# Patient Record
Sex: MALE | Race: BLACK OR AFRICAN AMERICAN | NOT HISPANIC OR LATINO | Employment: OTHER | URBAN - METROPOLITAN AREA
[De-identification: names, ages, dates, MRNs, and addresses within clinical notes are randomized per-mention and may not be internally consistent; named-entity substitution may affect disease eponyms.]

---

## 2017-07-06 ENCOUNTER — HISTORICAL (OUTPATIENT)
Dept: RADIOLOGY | Facility: HOSPITAL | Age: 57
End: 2017-07-06

## 2017-07-06 LAB
ABS NEUT (OLG): 2.13 X10(3)/MCL (ref 2.1–9.2)
ALBUMIN SERPL-MCNC: 4.3 GM/DL (ref 3.4–5)
ALBUMIN/GLOB SERPL: 1.3 RATIO (ref 1.1–2)
ALP SERPL-CCNC: 93 UNIT/L (ref 50–136)
ALT SERPL-CCNC: 26 UNIT/L (ref 12–78)
APPEARANCE, UA: CLEAR
AST SERPL-CCNC: 15 UNIT/L (ref 15–37)
BACTERIA #/AREA URNS AUTO: ABNORMAL /HPF
BASOPHILS # BLD AUTO: 0 X10(3)/MCL (ref 0–0.2)
BASOPHILS NFR BLD AUTO: 0 %
BILIRUB SERPL-MCNC: 0.8 MG/DL (ref 0.2–1)
BILIRUB UR QL STRIP: ABNORMAL
BILIRUBIN DIRECT+TOT PNL SERPL-MCNC: 0.1 MG/DL (ref 0–0.5)
BILIRUBIN DIRECT+TOT PNL SERPL-MCNC: 0.7 MG/DL (ref 0–0.8)
BUN SERPL-MCNC: 20 MG/DL (ref 7–18)
CALCIUM SERPL-MCNC: 9.4 MG/DL (ref 8.5–10.1)
CHLORIDE SERPL-SCNC: 109 MMOL/L (ref 98–107)
CHOLEST SERPL-MCNC: 177 MG/DL (ref 0–200)
CHOLEST/HDLC SERPL: 4.9 {RATIO} (ref 0–5)
CO2 SERPL-SCNC: 26 MMOL/L (ref 21–32)
COLOR UR: ABNORMAL
CREAT SERPL-MCNC: 1.02 MG/DL (ref 0.7–1.3)
EOSINOPHIL # BLD AUTO: 0.1 X10(3)/MCL (ref 0–0.9)
EOSINOPHIL NFR BLD AUTO: 2 %
ERYTHROCYTE [DISTWIDTH] IN BLOOD BY AUTOMATED COUNT: 12.7 % (ref 11.5–17)
GLOBULIN SER-MCNC: 3.4 GM/DL (ref 2.4–3.5)
GLUCOSE (UA): NEGATIVE
GLUCOSE SERPL-MCNC: 90 MG/DL (ref 74–106)
HCT VFR BLD AUTO: 42.7 % (ref 42–52)
HDLC SERPL-MCNC: 36 MG/DL (ref 35–60)
HGB BLD-MCNC: 14.1 GM/DL (ref 14–18)
HGB UR QL STRIP: NEGATIVE
KETONES UR QL STRIP: NEGATIVE
LDLC SERPL CALC-MCNC: 119 MG/DL (ref 0–129)
LEUKOCYTE ESTERASE UR QL STRIP: NEGATIVE
LYMPHOCYTES # BLD AUTO: 1.9 X10(3)/MCL (ref 0.6–4.6)
LYMPHOCYTES NFR BLD AUTO: 41 %
MCH RBC QN AUTO: 32.9 PG (ref 27–31)
MCHC RBC AUTO-ENTMCNC: 33 GM/DL (ref 33–36)
MCV RBC AUTO: 99.5 FL (ref 80–94)
MONOCYTES # BLD AUTO: 0.5 X10(3)/MCL (ref 0.1–1.3)
MONOCYTES NFR BLD AUTO: 11 %
NEUTROPHILS # BLD AUTO: 2.13 X10(3)/MCL (ref 1.4–7.9)
NEUTROPHILS NFR BLD AUTO: 46 %
NITRITE UR QL STRIP.AUTO: NEGATIVE
PH UR STRIP: 5.5 [PH] (ref 5–9)
PLATELET # BLD AUTO: 237 X10(3)/MCL (ref 130–400)
PMV BLD AUTO: 8.5 FL (ref 9.4–12.4)
POTASSIUM SERPL-SCNC: 5.1 MMOL/L (ref 3.5–5.1)
PROT SERPL-MCNC: 7.7 GM/DL (ref 6.4–8.2)
PROT UR QL STRIP: NEGATIVE
PSA SERPL-MCNC: 6.27 NG/ML (ref 0–4)
RBC # BLD AUTO: 4.29 X10(6)/MCL (ref 4.7–6.1)
RBC #/AREA URNS HPF: ABNORMAL /[HPF]
SODIUM SERPL-SCNC: 143 MMOL/L (ref 136–145)
SP GR UR STRIP: 1.03 (ref 1–1.03)
SQUAMOUS EPITHELIAL, UA: ABNORMAL
TRIGL SERPL-MCNC: 111 MG/DL (ref 30–150)
UROBILINOGEN UR STRIP-ACNC: 1
VIT B12 SERPL-MCNC: 669 PG/ML (ref 193–986)
VLDLC SERPL CALC-MCNC: 22 MG/DL
WBC # SPEC AUTO: 4.6 X10(3)/MCL (ref 4.5–11.5)
WBC #/AREA URNS AUTO: ABNORMAL /HPF (ref 0–3)

## 2019-12-19 ENCOUNTER — HISTORICAL (OUTPATIENT)
Dept: ADMINISTRATIVE | Facility: HOSPITAL | Age: 59
End: 2019-12-19

## 2019-12-19 LAB
ABS NEUT (OLG): 2.89 X10(3)/MCL (ref 2.1–9.2)
ALBUMIN SERPL-MCNC: 4.2 GM/DL (ref 3.4–5)
ALBUMIN/GLOB SERPL: 1.1 {RATIO}
ALP SERPL-CCNC: 88 UNIT/L (ref 50–136)
ALT SERPL-CCNC: 37 UNIT/L (ref 12–78)
APPEARANCE, UA: CLEAR
AST SERPL-CCNC: 19 UNIT/L (ref 15–37)
BACTERIA SPEC CULT: NORMAL /HPF
BASOPHILS # BLD AUTO: 0 X10(3)/MCL (ref 0–0.2)
BASOPHILS NFR BLD AUTO: 1 %
BILIRUB SERPL-MCNC: 0.9 MG/DL (ref 0.2–1)
BILIRUB UR QL STRIP: NEGATIVE
BILIRUBIN DIRECT+TOT PNL SERPL-MCNC: 0.2 MG/DL (ref 0–0.2)
BILIRUBIN DIRECT+TOT PNL SERPL-MCNC: 0.7 MG/DL (ref 0–0.8)
BUN SERPL-MCNC: 15 MG/DL (ref 7–18)
CALCIUM SERPL-MCNC: 9.6 MG/DL (ref 8.5–10.1)
CHLORIDE SERPL-SCNC: 105 MMOL/L (ref 98–107)
CO2 SERPL-SCNC: 26 MMOL/L (ref 21–32)
COLOR UR: YELLOW
CREAT SERPL-MCNC: 0.96 MG/DL (ref 0.7–1.3)
EOSINOPHIL # BLD AUTO: 0 X10(3)/MCL (ref 0–0.9)
EOSINOPHIL NFR BLD AUTO: 1 %
ERYTHROCYTE [DISTWIDTH] IN BLOOD BY AUTOMATED COUNT: 12.6 % (ref 11.5–17)
GLOBULIN SER-MCNC: 3.9 GM/DL (ref 2.4–3.5)
GLUCOSE (UA): NEGATIVE
GLUCOSE SERPL-MCNC: 94 MG/DL (ref 74–106)
HCT VFR BLD AUTO: 44 % (ref 42–52)
HGB BLD-MCNC: 14.3 GM/DL (ref 14–18)
HGB UR QL STRIP: NEGATIVE
KETONES UR QL STRIP: NEGATIVE
LEUKOCYTE ESTERASE UR QL STRIP: NEGATIVE
LYMPHOCYTES # BLD AUTO: 2 X10(3)/MCL (ref 0.6–4.6)
LYMPHOCYTES NFR BLD AUTO: 37 %
MCH RBC QN AUTO: 32.1 PG (ref 27–31)
MCHC RBC AUTO-ENTMCNC: 32.5 GM/DL (ref 33–36)
MCV RBC AUTO: 98.9 FL (ref 80–94)
MONOCYTES # BLD AUTO: 0.4 X10(3)/MCL (ref 0.1–1.3)
MONOCYTES NFR BLD AUTO: 8 %
NEUTROPHILS # BLD AUTO: 2.89 X10(3)/MCL (ref 2.1–9.2)
NEUTROPHILS NFR BLD AUTO: 54 %
NITRITE UR QL STRIP: NEGATIVE
PH UR STRIP: 5.5 [PH] (ref 5–9)
PLATELET # BLD AUTO: 252 X10(3)/MCL (ref 130–400)
PMV BLD AUTO: 9 FL (ref 9.4–12.4)
POTASSIUM SERPL-SCNC: 4.3 MMOL/L (ref 3.5–5.1)
PROT SERPL-MCNC: 8.1 GM/DL (ref 6.4–8.2)
PROT UR QL STRIP: NEGATIVE
PSA SERPL-MCNC: 12.9 NG/ML (ref 0–4)
RBC # BLD AUTO: 4.45 X10(6)/MCL (ref 4.7–6.1)
RBC #/AREA URNS HPF: NORMAL /[HPF]
SODIUM SERPL-SCNC: 137 MMOL/L (ref 136–145)
SP GR UR STRIP: 1.03 (ref 1–1.03)
SQUAMOUS EPITHELIAL, UA: NORMAL
TSH SERPL-ACNC: 0.86 MIU/L (ref 0.36–3.74)
URATE SERPL-MCNC: 5.1 MG/DL (ref 2.6–7.2)
UROBILINOGEN UR STRIP-ACNC: 0.2
WBC # SPEC AUTO: 5.4 X10(3)/MCL (ref 4.5–11.5)
WBC #/AREA URNS HPF: NORMAL /HPF

## 2019-12-26 ENCOUNTER — HISTORICAL (OUTPATIENT)
Dept: RADIOLOGY | Facility: HOSPITAL | Age: 59
End: 2019-12-26

## 2020-01-13 LAB — CRC RECOMMENDATION EXT: NORMAL

## 2020-12-07 ENCOUNTER — HISTORICAL (OUTPATIENT)
Dept: CARDIOLOGY | Facility: HOSPITAL | Age: 60
End: 2020-12-07

## 2021-01-05 ENCOUNTER — HISTORICAL (OUTPATIENT)
Dept: ADMINISTRATIVE | Facility: HOSPITAL | Age: 61
End: 2021-01-05

## 2021-01-05 LAB
BUN SERPL-MCNC: 13.4 MG/DL (ref 8.4–25.7)
CALCIUM SERPL-MCNC: 8.6 MG/DL (ref 8.4–10.2)
CHLORIDE SERPL-SCNC: 103 MMOL/L (ref 98–107)
CO2 SERPL-SCNC: 25 MMOL/L (ref 22–29)
CREAT SERPL-MCNC: 0.87 MG/DL (ref 0.72–1.25)
CREAT/UREA NIT SERPL: 15
GLUCOSE SERPL-MCNC: 86 MG/DL (ref 74–100)
POC CREATININE: 1.1 MG/DL (ref 0.6–1.3)
POTASSIUM SERPL-SCNC: 4.5 MMOL/L (ref 3.5–5.1)
SODIUM SERPL-SCNC: 134 MMOL/L (ref 136–145)

## 2021-03-31 ENCOUNTER — HISTORICAL (OUTPATIENT)
Dept: ADMINISTRATIVE | Facility: HOSPITAL | Age: 61
End: 2021-03-31

## 2021-03-31 LAB
ABS NEUT (OLG): 1.98 X10(3)/MCL (ref 2.1–9.2)
ALBUMIN SERPL-MCNC: 4.4 GM/DL (ref 3.5–5)
ALBUMIN/GLOB SERPL: 1.5 RATIO (ref 1.1–2)
ALP SERPL-CCNC: 78 UNIT/L (ref 40–150)
ALT SERPL-CCNC: 12 UNIT/L (ref 0–55)
APPEARANCE, UA: CLEAR
AST SERPL-CCNC: 15 UNIT/L (ref 5–34)
BACTERIA SPEC CULT: ABNORMAL /HPF
BASOPHILS # BLD AUTO: 0 X10(3)/MCL (ref 0–0.2)
BASOPHILS NFR BLD AUTO: 1 %
BILIRUB SERPL-MCNC: 0.9 MG/DL
BILIRUB UR QL STRIP: NEGATIVE
BILIRUBIN DIRECT+TOT PNL SERPL-MCNC: 0.3 MG/DL (ref 0–0.5)
BILIRUBIN DIRECT+TOT PNL SERPL-MCNC: 0.6 MG/DL (ref 0–0.8)
BUN SERPL-MCNC: 13.2 MG/DL (ref 8.4–25.7)
CALCIUM SERPL-MCNC: 9.3 MG/DL (ref 8.4–10.2)
CHLORIDE SERPL-SCNC: 107 MMOL/L (ref 98–107)
CHOLEST SERPL-MCNC: 171 MG/DL
CHOLEST/HDLC SERPL: 4 {RATIO} (ref 0–5)
CO2 SERPL-SCNC: 28 MMOL/L (ref 22–29)
COLOR UR: YELLOW
CREAT SERPL-MCNC: 0.91 MG/DL (ref 0.73–1.18)
DEPRECATED CALCIDIOL+CALCIFEROL SERPL-MC: 10.5 NG/ML (ref 30–80)
EOSINOPHIL # BLD AUTO: 0.1 X10(3)/MCL (ref 0–0.9)
EOSINOPHIL NFR BLD AUTO: 2 %
ERYTHROCYTE [DISTWIDTH] IN BLOOD BY AUTOMATED COUNT: 13.1 % (ref 11.5–17)
EST. AVERAGE GLUCOSE BLD GHB EST-MCNC: 102.5 MG/DL
GLOBULIN SER-MCNC: 3 GM/DL (ref 2.4–3.5)
GLUCOSE (UA): NEGATIVE
GLUCOSE SERPL-MCNC: 80 MG/DL (ref 74–100)
HAV IGM SERPL QL IA: NONREACTIVE
HBA1C MFR BLD: 5.2 %
HBV CORE IGM SERPL QL IA: NONREACTIVE
HBV SURFACE AG SERPL QL IA: NONREACTIVE
HCT VFR BLD AUTO: 41.1 % (ref 42–52)
HCV AB SERPL QL IA: NONREACTIVE
HDLC SERPL-MCNC: 43 MG/DL (ref 35–60)
HEPATITIS PANEL INTERP: NORMAL
HGB BLD-MCNC: 13.1 GM/DL (ref 14–18)
HGB UR QL STRIP: NEGATIVE
HIV 1+2 AB+HIV1 P24 AG SERPL QL IA: NONREACTIVE
KETONES UR QL STRIP: ABNORMAL
LDLC SERPL CALC-MCNC: 113 MG/DL (ref 50–140)
LEUKOCYTE ESTERASE UR QL STRIP: NEGATIVE
LYMPHOCYTES # BLD AUTO: 2.1 X10(3)/MCL (ref 0.6–4.6)
LYMPHOCYTES NFR BLD AUTO: 45 %
MCH RBC QN AUTO: 32.8 PG (ref 27–31)
MCHC RBC AUTO-ENTMCNC: 31.9 GM/DL (ref 33–36)
MCV RBC AUTO: 103 FL (ref 80–94)
MONOCYTES # BLD AUTO: 0.4 X10(3)/MCL (ref 0.1–1.3)
MONOCYTES NFR BLD AUTO: 9 %
NEUTROPHILS # BLD AUTO: 1.98 X10(3)/MCL (ref 2.1–9.2)
NEUTROPHILS NFR BLD AUTO: 42 %
NITRITE UR QL STRIP: NEGATIVE
PH UR STRIP: 5.5 [PH] (ref 5–9)
PLATELET # BLD AUTO: 221 X10(3)/MCL (ref 130–400)
PMV BLD AUTO: 9.2 FL (ref 9.4–12.4)
POTASSIUM SERPL-SCNC: 4.9 MMOL/L (ref 3.5–5.1)
PROT SERPL-MCNC: 7.4 GM/DL (ref 6.4–8.3)
PROT UR QL STRIP: NEGATIVE
PSA SERPL-MCNC: 20.52 NG/ML
RBC # BLD AUTO: 3.99 X10(6)/MCL (ref 4.7–6.1)
RBC #/AREA URNS HPF: ABNORMAL /[HPF]
SODIUM SERPL-SCNC: 144 MMOL/L (ref 136–145)
SP GR UR STRIP: 1.02 (ref 1–1.03)
SQUAMOUS EPITHELIAL, UA: ABNORMAL /HPF (ref 0–4)
T PALLIDUM AB SER QL: NONREACTIVE
TRIGL SERPL-MCNC: 74 MG/DL (ref 34–140)
TSH SERPL-ACNC: 0.78 UIU/ML (ref 0.35–4.94)
UROBILINOGEN UR STRIP-ACNC: 1
VLDLC SERPL CALC-MCNC: 15 MG/DL
WBC # SPEC AUTO: 4.7 X10(3)/MCL (ref 4.5–11.5)
WBC #/AREA URNS HPF: ABNORMAL /HPF

## 2022-04-07 ENCOUNTER — HISTORICAL (OUTPATIENT)
Dept: ADMINISTRATIVE | Facility: HOSPITAL | Age: 62
End: 2022-04-07

## 2022-04-24 VITALS
HEIGHT: 69 IN | DIASTOLIC BLOOD PRESSURE: 85 MMHG | SYSTOLIC BLOOD PRESSURE: 125 MMHG | OXYGEN SATURATION: 96 % | BODY MASS INDEX: 21.78 KG/M2 | WEIGHT: 147.06 LBS

## 2022-08-24 DIAGNOSIS — C61 MALIGNANT NEOPLASM OF PROSTATE: Primary | ICD-10-CM

## 2022-10-12 DIAGNOSIS — C61 MALIGNANT NEOPLASM OF PROSTATE: Primary | ICD-10-CM

## 2022-11-03 ENCOUNTER — HOSPITAL ENCOUNTER (OUTPATIENT)
Dept: RADIOLOGY | Facility: HOSPITAL | Age: 62
Discharge: HOME | End: 2022-11-03
Attending: UROLOGY
Payer: MEDICARE

## 2022-11-03 DIAGNOSIS — C61 MALIGNANT NEOPLASM OF PROSTATE: ICD-10-CM

## 2022-11-03 PROCEDURE — 78815 PET IMAGE W/CT SKULL-THIGH: CPT | Mod: TC,PS

## 2022-12-19 ENCOUNTER — DOCUMENTATION ONLY (OUTPATIENT)
Dept: FAMILY MEDICINE | Facility: CLINIC | Age: 62
End: 2022-12-19
Payer: MEDICAID

## 2024-03-13 DIAGNOSIS — I73.9 PAD (PERIPHERAL ARTERY DISEASE): Primary | ICD-10-CM

## 2024-03-13 RX ORDER — TRAMADOL HYDROCHLORIDE 50 MG/1
50 TABLET ORAL
Status: ON HOLD | COMMUNITY
Start: 2023-09-27 | End: 2024-04-19 | Stop reason: HOSPADM

## 2024-03-13 NOTE — PROGRESS NOTES
"    [facility] - Clinic Note  Joe Lynne MD      Patient Name: Peng Reed                   : 1960      MRN: [id number]   Visit Date: 3/18/2024       History Present Illness     Reason for Visit: Leg Pain    Mr. Reed presents to the clinic for evaluation of foot pain. He is being referred by his podiatrsist, Dr. Zelaya. He has a history of revascularization with Dr. Motley in , however he failed to maintain follow up. He is s/p left dtjupxk-xa-rulxtezeg bypass 21 and right etxircv-kv-reukkgfte bypass 3/4/21. He said he has has had left foot pain since the surgery.  He says he can walk about 50 ft before he has to stop due to claudication symptoms.  He denies any wounds to his lower extremities.  He does not take any antiplatelet medications.  His only medication currently is tramadol.  He does smoke about a pack per week.      REVIEW OF SYSTEMS:  12 point review of systems conducted, negative except as stated in the history of present illness. See HPI for details.        Physical Exam      Vitals:    24 0858 24 0859   BP: 137/89 (!) 137/93   BP Location: Left arm Right arm   Pulse: 78 73   Weight: 68 kg (150 lb)    Height: 5' 9" (1.753 m)           General: well-nourished, no acute distress, and healthy appearing, alert, pleasant, conversant, and oriented  Neurologic: cranial nerves are grossly intact, no neurologic deficits, no motor deficits, and no sensory deficits  Neck/Chest: normal , soft without lymphadenopathy, and no carotid bruits noted  Respiratory: breathing easily, without respiratory distress, and normal breath sounds  Abdomen: normal and soft  Cardiology: regular rate and rhythm and no audible murmur    Upper Extremity Arterial Exam:   Right - radial is palpable and brachial is palpable  Left - radial is palpable and brachial is palpable    Lower Extremity Arterial Exam:  Right - femoral is palpable, posterior tibial is absent, and dorsalis pedis is " biphasic with doppler  Left - femoral is palpable, posterior tibial is absent, and dorsalis pedis is absent    Musculoskeletal:   Lower Extremity: no edema present to bilateral lower extremities              Assessment and Plan     Mr. Reed is a 63 y.o. male with left lower extremity severe short distance claudication.  His ABIs are consistent with severe flow limitation of the left lower extremity.  I am concerned about the patency of his bypass graft.  In reviewing the op note it sounds like his left lower extremity had a bypass to an isolated popliteal segment.  I would like to evaluate him with an angiogram for potential operative planning.  I discussed we will proceed from a radial and brachial access and evaluate both lower extremities.  He understands and would like to proceed.  We will also refer her to set up with a primary care provider as he says he does not have 1 and has a history of colon cancer which it sounds like has not had any surveillance and many years.  Additionally I would like him to start an 81 mg aspirin daily.          1. Atherosclerosis of native arteries of extremities with rest pain, right leg  - Ankle Brachial Indices (NIR); Future    2. Atherosclerosis of native arteries of extremities with rest pain, left leg  - Ankle Brachial Indices (NIR); Future    3. S/P femoral-popliteal bypass surgery    4. PAD (peripheral artery disease)  - Ambulatory referral/consult to Vascular Surgery  - Basic Metabolic Panel; Future          Imaging Obtained/Reviewed   Study:  ABIs today  0.61 on the right and 0.34 on the left.      Medical History     Past Medical History:   Diagnosis Date    Atherosclerosis of native arteries of extremities with intermittent claudication, unspecified extremity     Colon cancer     Eczema     Gout, unspecified     Heart murmur     Hip pain, acute, left     Prostate cancer      Past Surgical History:   Procedure Laterality Date    FEMORAL BYPASS Left 01/13/2021     Surgeon: Dr. Motley    FEMORAL BYPASS Right 03/04/2021    Surgeon: Dr. Motley    HIP ARTHROPLASTY Left     PROSTATE BIOPSY       History reviewed. No pertinent family history.  Social History     Socioeconomic History    Marital status:    Tobacco Use    Smoking status: Every Day     Current packs/day: 0.50     Average packs/day: 0.5 packs/day for 44.2 years (22.1 ttl pk-yrs)     Types: Cigarettes     Start date: 1980    Smokeless tobacco: Never     Current Outpatient Medications   Medication Instructions    traMADoL (ULTRAM) 50 mg, Oral     Review of patient's allergies indicates:  No Known Allergies    Patient Care Team:  No, Primary Doctor as PCP - Anderson Cuello DPM as Consulting Physician (Podiatry)        No follow-ups on file. In addition to their scheduled follow up, the patient has also been instructed to follow up on as needed basis.     No future appointments.

## 2024-03-13 NOTE — H&P (VIEW-ONLY)
"    [facility] - Clinic Note  Joe Lynne MD      Patient Name: Peng Reed                   : 1960      MRN: [id number]   Visit Date: 3/18/2024       History Present Illness     Reason for Visit: Leg Pain    Mr. Reed presents to the clinic for evaluation of foot pain. He is being referred by his podiatrsist, Dr. Zelaya. He has a history of revascularization with Dr. Motley in , however he failed to maintain follow up. He is s/p left vmqfpow-ll-bokpbhofp bypass 21 and right guvdush-eu-eepqiginz bypass 3/4/21. He said he has has had left foot pain since the surgery.  He says he can walk about 50 ft before he has to stop due to claudication symptoms.  He denies any wounds to his lower extremities.  He does not take any antiplatelet medications.  His only medication currently is tramadol.  He does smoke about a pack per week.      REVIEW OF SYSTEMS:  12 point review of systems conducted, negative except as stated in the history of present illness. See HPI for details.        Physical Exam      Vitals:    24 0858 24 0859   BP: 137/89 (!) 137/93   BP Location: Left arm Right arm   Pulse: 78 73   Weight: 68 kg (150 lb)    Height: 5' 9" (1.753 m)           General: well-nourished, no acute distress, and healthy appearing, alert, pleasant, conversant, and oriented  Neurologic: cranial nerves are grossly intact, no neurologic deficits, no motor deficits, and no sensory deficits  Neck/Chest: normal , soft without lymphadenopathy, and no carotid bruits noted  Respiratory: breathing easily, without respiratory distress, and normal breath sounds  Abdomen: normal and soft  Cardiology: regular rate and rhythm and no audible murmur    Upper Extremity Arterial Exam:   Right - radial is palpable and brachial is palpable  Left - radial is palpable and brachial is palpable    Lower Extremity Arterial Exam:  Right - femoral is palpable, posterior tibial is absent, and dorsalis pedis is " biphasic with doppler  Left - femoral is palpable, posterior tibial is absent, and dorsalis pedis is absent    Musculoskeletal:   Lower Extremity: no edema present to bilateral lower extremities              Assessment and Plan     Mr. Reed is a 63 y.o. male with left lower extremity severe short distance claudication.  His ABIs are consistent with severe flow limitation of the left lower extremity.  I am concerned about the patency of his bypass graft.  In reviewing the op note it sounds like his left lower extremity had a bypass to an isolated popliteal segment.  I would like to evaluate him with an angiogram for potential operative planning.  I discussed we will proceed from a radial and brachial access and evaluate both lower extremities.  He understands and would like to proceed.  We will also refer her to set up with a primary care provider as he says he does not have 1 and has a history of colon cancer which it sounds like has not had any surveillance and many years.  Additionally I would like him to start an 81 mg aspirin daily.          1. Atherosclerosis of native arteries of extremities with rest pain, right leg  - Ankle Brachial Indices (NIR); Future    2. Atherosclerosis of native arteries of extremities with rest pain, left leg  - Ankle Brachial Indices (NIR); Future    3. S/P femoral-popliteal bypass surgery    4. PAD (peripheral artery disease)  - Ambulatory referral/consult to Vascular Surgery  - Basic Metabolic Panel; Future          Imaging Obtained/Reviewed   Study:  ABIs today  0.61 on the right and 0.34 on the left.      Medical History     Past Medical History:   Diagnosis Date    Atherosclerosis of native arteries of extremities with intermittent claudication, unspecified extremity     Colon cancer     Eczema     Gout, unspecified     Heart murmur     Hip pain, acute, left     Prostate cancer      Past Surgical History:   Procedure Laterality Date    FEMORAL BYPASS Left 01/13/2021     Surgeon: Dr. Motley    FEMORAL BYPASS Right 03/04/2021    Surgeon: Dr. Motley    HIP ARTHROPLASTY Left     PROSTATE BIOPSY       History reviewed. No pertinent family history.  Social History     Socioeconomic History    Marital status:    Tobacco Use    Smoking status: Every Day     Current packs/day: 0.50     Average packs/day: 0.5 packs/day for 44.2 years (22.1 ttl pk-yrs)     Types: Cigarettes     Start date: 1980    Smokeless tobacco: Never     Current Outpatient Medications   Medication Instructions    traMADoL (ULTRAM) 50 mg, Oral     Review of patient's allergies indicates:  No Known Allergies    Patient Care Team:  No, Primary Doctor as PCP - Anderson Cuello DPM as Consulting Physician (Podiatry)        No follow-ups on file. In addition to their scheduled follow up, the patient has also been instructed to follow up on as needed basis.     No future appointments.

## 2024-03-18 ENCOUNTER — OFFICE VISIT (OUTPATIENT)
Dept: VASCULAR SURGERY | Facility: CLINIC | Age: 64
End: 2024-03-18
Payer: MEDICARE

## 2024-03-18 VITALS
SYSTOLIC BLOOD PRESSURE: 137 MMHG | HEART RATE: 73 BPM | BODY MASS INDEX: 22.22 KG/M2 | DIASTOLIC BLOOD PRESSURE: 93 MMHG | HEIGHT: 69 IN | WEIGHT: 150 LBS

## 2024-03-18 DIAGNOSIS — I73.9 PAD (PERIPHERAL ARTERY DISEASE): Primary | ICD-10-CM

## 2024-03-18 DIAGNOSIS — I70.221 ATHEROSCLEROSIS OF NATIVE ARTERIES OF EXTREMITIES WITH REST PAIN, RIGHT LEG: ICD-10-CM

## 2024-03-18 DIAGNOSIS — I73.9 PERIPHERAL VASCULAR DISEASE, UNSPECIFIED: ICD-10-CM

## 2024-03-18 DIAGNOSIS — I70.222 ATHEROSCLEROSIS OF NATIVE ARTERIES OF EXTREMITIES WITH REST PAIN, LEFT LEG: ICD-10-CM

## 2024-03-18 DIAGNOSIS — I70.221 ATHEROSCLEROSIS OF NATIVE ARTERIES OF EXTREMITIES WITH REST PAIN, RIGHT LEG: Primary | ICD-10-CM

## 2024-03-18 DIAGNOSIS — I73.9 PAD (PERIPHERAL ARTERY DISEASE): ICD-10-CM

## 2024-03-18 DIAGNOSIS — Z95.828 S/P FEMORAL-POPLITEAL BYPASS SURGERY: ICD-10-CM

## 2024-03-18 PROCEDURE — 3075F SYST BP GE 130 - 139MM HG: CPT | Mod: CPTII,,, | Performed by: SURGERY

## 2024-03-18 PROCEDURE — 1160F RVW MEDS BY RX/DR IN RCRD: CPT | Mod: CPTII,,, | Performed by: SURGERY

## 2024-03-18 PROCEDURE — 99204 OFFICE O/P NEW MOD 45 MIN: CPT | Mod: ,,, | Performed by: SURGERY

## 2024-03-18 PROCEDURE — 3079F DIAST BP 80-89 MM HG: CPT | Mod: CPTII,,, | Performed by: SURGERY

## 2024-03-18 PROCEDURE — 1159F MED LIST DOCD IN RCRD: CPT | Mod: CPTII,,, | Performed by: SURGERY

## 2024-03-18 PROCEDURE — 3008F BODY MASS INDEX DOCD: CPT | Mod: CPTII,,, | Performed by: SURGERY

## 2024-03-18 RX ORDER — SODIUM CHLORIDE 9 MG/ML
INJECTION, SOLUTION INTRAVENOUS CONTINUOUS
Status: CANCELLED | OUTPATIENT
Start: 2024-03-25

## 2024-03-19 DIAGNOSIS — I70.222 ATHEROSCLEROSIS OF NATIVE ARTERIES OF EXTREMITIES WITH REST PAIN, LEFT LEG: Primary | ICD-10-CM

## 2024-03-19 DIAGNOSIS — I70.223 ATHEROSCLEROSIS OF NATIVE ARTERIES OF EXTREMITIES WITH REST PAIN, BILATERAL LEGS: ICD-10-CM

## 2024-03-19 DIAGNOSIS — I70.221 ATHEROSCLEROSIS OF NATIVE ARTERIES OF EXTREMITIES WITH REST PAIN, RIGHT LEG: ICD-10-CM

## 2024-03-19 DIAGNOSIS — I70.222 ATHEROSCLEROSIS OF NATIVE ARTERY OF LEFT LOWER EXTREMITY WITH REST PAIN: ICD-10-CM

## 2024-03-19 RX ORDER — SODIUM CHLORIDE 9 MG/ML
INJECTION, SOLUTION INTRAVENOUS CONTINUOUS
Status: CANCELLED | OUTPATIENT
Start: 2024-03-25

## 2024-03-21 ENCOUNTER — LAB VISIT (OUTPATIENT)
Dept: LAB | Facility: HOSPITAL | Age: 64
End: 2024-03-21
Attending: SURGERY
Payer: MEDICARE

## 2024-03-21 DIAGNOSIS — I73.9 PAD (PERIPHERAL ARTERY DISEASE): ICD-10-CM

## 2024-03-21 LAB
ANION GAP SERPL CALC-SCNC: 6 MEQ/L
BUN SERPL-MCNC: 11.1 MG/DL (ref 8.4–25.7)
CALCIUM SERPL-MCNC: 9.8 MG/DL (ref 8.8–10)
CHLORIDE SERPL-SCNC: 107 MMOL/L (ref 98–107)
CO2 SERPL-SCNC: 25 MMOL/L (ref 23–31)
CREAT SERPL-MCNC: 0.91 MG/DL (ref 0.73–1.18)
CREAT/UREA NIT SERPL: 12
GFR SERPLBLD CREATININE-BSD FMLA CKD-EPI: >60 MLS/MIN/1.73/M2
GLUCOSE SERPL-MCNC: 100 MG/DL (ref 82–115)
POTASSIUM SERPL-SCNC: 4.8 MMOL/L (ref 3.5–5.1)
SODIUM SERPL-SCNC: 138 MMOL/L (ref 136–145)

## 2024-03-21 PROCEDURE — 36415 COLL VENOUS BLD VENIPUNCTURE: CPT

## 2024-03-21 PROCEDURE — 80048 BASIC METABOLIC PNL TOTAL CA: CPT

## 2024-03-25 ENCOUNTER — HOSPITAL ENCOUNTER (OUTPATIENT)
Facility: HOSPITAL | Age: 64
Discharge: HOME | End: 2024-03-25
Attending: SURGERY | Admitting: SURGERY
Payer: MEDICARE

## 2024-03-25 VITALS
HEIGHT: 69 IN | WEIGHT: 155.88 LBS | SYSTOLIC BLOOD PRESSURE: 107 MMHG | TEMPERATURE: 98 F | DIASTOLIC BLOOD PRESSURE: 65 MMHG | HEART RATE: 72 BPM | OXYGEN SATURATION: 99 % | BODY MASS INDEX: 23.09 KG/M2

## 2024-03-25 DIAGNOSIS — I70.222 ATHEROSCLEROSIS OF NATIVE ARTERIES OF EXTREMITIES WITH REST PAIN, LEFT LEG: ICD-10-CM

## 2024-03-25 DIAGNOSIS — I70.223 ATHEROSCLEROSIS OF NATIVE ARTERIES OF EXTREMITIES WITH REST PAIN, BILATERAL LEGS: ICD-10-CM

## 2024-03-25 DIAGNOSIS — I70.221 ATHEROSCLEROSIS OF NATIVE ARTERIES OF EXTREMITIES WITH REST PAIN, RIGHT LEG: ICD-10-CM

## 2024-03-25 PROCEDURE — 36245 INS CATH ABD/L-EXT ART 1ST: CPT | Mod: 50,,, | Performed by: SURGERY

## 2024-03-25 PROCEDURE — C1769 GUIDE WIRE: HCPCS | Performed by: SURGERY

## 2024-03-25 PROCEDURE — C1894 INTRO/SHEATH, NON-LASER: HCPCS | Performed by: SURGERY

## 2024-03-25 PROCEDURE — 36245 INS CATH ABD/L-EXT ART 1ST: CPT | Mod: 50 | Performed by: SURGERY

## 2024-03-25 PROCEDURE — 25500020 PHARM REV CODE 255: Performed by: SURGERY

## 2024-03-25 PROCEDURE — 99152 MOD SED SAME PHYS/QHP 5/>YRS: CPT | Performed by: SURGERY

## 2024-03-25 PROCEDURE — 75625 CONTRAST EXAM ABDOMINL AORTA: CPT | Performed by: SURGERY

## 2024-03-25 PROCEDURE — 25000003 PHARM REV CODE 250: Performed by: SURGERY

## 2024-03-25 PROCEDURE — C1887 CATHETER, GUIDING: HCPCS | Performed by: SURGERY

## 2024-03-25 PROCEDURE — 63600175 PHARM REV CODE 636 W HCPCS: Performed by: SURGERY

## 2024-03-25 PROCEDURE — 75716 ARTERY X-RAYS ARMS/LEGS: CPT | Mod: 26,,, | Performed by: SURGERY

## 2024-03-25 PROCEDURE — 75625 CONTRAST EXAM ABDOMINL AORTA: CPT | Mod: 26,,, | Performed by: SURGERY

## 2024-03-25 PROCEDURE — 75716 ARTERY X-RAYS ARMS/LEGS: CPT | Performed by: SURGERY

## 2024-03-25 PROCEDURE — 99152 MOD SED SAME PHYS/QHP 5/>YRS: CPT | Mod: ,,, | Performed by: SURGERY

## 2024-03-25 RX ORDER — ONDANSETRON HYDROCHLORIDE 2 MG/ML
4 INJECTION, SOLUTION INTRAVENOUS EVERY 8 HOURS PRN
Status: DISCONTINUED | OUTPATIENT
Start: 2024-03-25 | End: 2024-03-25 | Stop reason: HOSPADM

## 2024-03-25 RX ORDER — LIDOCAINE HYDROCHLORIDE 20 MG/ML
INJECTION, SOLUTION INFILTRATION; PERINEURAL
Status: DISCONTINUED | OUTPATIENT
Start: 2024-03-25 | End: 2024-03-25 | Stop reason: HOSPADM

## 2024-03-25 RX ORDER — FENTANYL CITRATE 50 UG/ML
INJECTION, SOLUTION INTRAMUSCULAR; INTRAVENOUS
Status: DISCONTINUED | OUTPATIENT
Start: 2024-03-25 | End: 2024-03-25 | Stop reason: HOSPADM

## 2024-03-25 RX ORDER — SODIUM CHLORIDE 9 MG/ML
INJECTION, SOLUTION INTRAVENOUS CONTINUOUS
Status: DISCONTINUED | OUTPATIENT
Start: 2024-03-25 | End: 2024-03-25 | Stop reason: HOSPADM

## 2024-03-25 RX ORDER — ASPIRIN 81 MG/1
81 TABLET ORAL DAILY
COMMUNITY

## 2024-03-25 RX ORDER — MIDAZOLAM HYDROCHLORIDE 1 MG/ML
INJECTION INTRAMUSCULAR; INTRAVENOUS
Status: DISCONTINUED | OUTPATIENT
Start: 2024-03-25 | End: 2024-03-25 | Stop reason: HOSPADM

## 2024-03-25 RX ORDER — HYDRALAZINE HYDROCHLORIDE 20 MG/ML
10 INJECTION INTRAMUSCULAR; INTRAVENOUS EVERY 4 HOURS PRN
Status: DISCONTINUED | OUTPATIENT
Start: 2024-03-25 | End: 2024-03-25 | Stop reason: HOSPADM

## 2024-03-25 RX ORDER — ACETAMINOPHEN 325 MG/1
650 TABLET ORAL EVERY 4 HOURS PRN
Status: DISCONTINUED | OUTPATIENT
Start: 2024-03-25 | End: 2024-03-25 | Stop reason: HOSPADM

## 2024-03-25 RX ORDER — HEPARIN SODIUM 1000 [USP'U]/ML
INJECTION, SOLUTION INTRAVENOUS; SUBCUTANEOUS
Status: DISCONTINUED | OUTPATIENT
Start: 2024-03-25 | End: 2024-03-25 | Stop reason: HOSPADM

## 2024-03-25 RX ORDER — ASPIRIN 81 MG/1
81 TABLET ORAL
Status: DISCONTINUED | OUTPATIENT
Start: 2024-03-25 | End: 2024-03-25 | Stop reason: HOSPADM

## 2024-03-25 RX ADMIN — ASPIRIN 81 MG: 81 TABLET, COATED ORAL at 11:03

## 2024-03-25 RX ADMIN — SODIUM CHLORIDE: 9 INJECTION, SOLUTION INTRAVENOUS at 11:03

## 2024-03-25 NOTE — DISCHARGE INSTRUCTIONS
-Remove dressing and armboard in 24hrs  -May shower after dressing removed, wash with soap and water only  -No driving for two Days  -Do not lift anything heavier than a gallon of milk for 5 days  -No tub bathing for 5 days  -No ointment, lotions, powders, creams around site for 5 days  - Return to the nearest emergency room if you start running a fever; have any kind of discharge coming from the site, the site looks red or swollen.  - If site starts to bleed, lay flat on the ground, apply pressure to the site and call 911.

## 2024-03-25 NOTE — Clinical Note
The groin and left radial was prepped. The site was prepped with ChloraPrep. The patient was draped.

## 2024-03-25 NOTE — OP NOTE
[facility]    Patient: Peng Reed    Date of Procedure: 03/25/2024    Preop Diagnosis:  Bilateral lower extremity PAD with left lower extremity rest pain    Postop Diagnosis:  Same    Procedure:  1.  Moderate sedation directly supervised by me for 20 minutes  2. Ultrasound-guided access of the left radial artery   3. Left lower extremity angiogram with final catheter placement in the left common iliac artery   4. Right lower extremity angiogram with final catheter placement in the right common iliac artery      Surgeon: Joe Lynne    Indication for Procedure: Peng Reed is a 63 y.o. male  with a history of peripheral arterial disease status post bilateral fem-pop bypasses in the past.  He presented as a referral to me with complaints of left lower extremity rest pain.  He was taken for diagnostic angiography.    Anesthesia:  Moderate sedation directly supervised by me and 1% lidocaine local    Blood Loss:  5 mL    Findings:  Ultrasound evaluation of the left radial artery noted it to be patent.  Aortography performed from the distal aorta no the distal aorta and bilateral common iliac arteries to be patent.  On the left, the common iliac artery is patent.  The left internal iliac artery was patent.  The left external iliac artery is occluded.  The common femoral artery reconstitutes from collaterals.  The profunda is patent on the left.  The SFA is occluded but does reconstitute and isolated popliteal segment at around the knee.  The previous left lower extremity bypass is occluded.  The popliteal segment of the knee then occludes and collaterals refill the tibial vessels with three-vessel runoff to the distal calf.  On the right, the common iliac artery is patent.  The external iliac and internal iliac arteries are patent.  The right common femoral artery was patent.  The profunda is patent.  The SFA is occluded down to the popliteal.  The popliteal does reconstitute at the  knee.  Beyond this this is patent to a patent TP trunk, PT, and peroneal.  The anterior tibial is occluded at its origin but does reconstitute reasonably proximally in the leg via a collateral.    Implants:  None    Closure:  TR band    Procedure in Detail:  After informed consent was obtained, and risks and benefits were discussed with the patient, he was brought to the cath lab and placed supine.  Appropriate support lines were placed and moderate sedation was begun directly supervised by me.  Ultrasound evaluation of the left radial artery was performed.  Local anesthetic was infused in the skin overlying it.  It was then accessed with a micropuncture needle.  Wire was then advanced up and a 4 Bruneian slender sheath was advanced over the wire.  Radial cocktail was then given and the patient was systemically heparinized.  A Glidewire and long Glide catheter were then advanced up the left arm through the aortic arch and down the thoracic aorta.  I then placed my catheter in the mid abdominal aorta and performed angiography of the aorta and iliacs.  I then selected out the left common iliac and placed my catheter and there.  Dedicated angiography of the left lower extremity was performed.  The patient's hip prosthesis was obscuring some views of the external iliac artery, however using obliquities I was able to visualize the full artery.  Angiography was performed all the way down to the distal calf.  I then brought the catheter back out and selected out the right external iliac artery.  I performed angiography of the right lower extremity.  The catheter was then removed.  A TR band was placed around the access site and the sheath removed.  Pressure was held and the patient was transferred back to cath lab holding in stable condition.    Complications: None    Contrast Volume: 45 mL    Specimens: None    Flouro Time: 3.4 min

## 2024-03-25 NOTE — DISCHARGE SUMMARY
[facility] - Cath Lab Services  Discharge Note  Short Stay    Procedure(s) (LRB):  ARTERIOGRAM-LEG AND ULTRASOUND (Bilateral)      OUTCOME: Patient tolerated treatment/procedure well without complication and is now ready for discharge.    DISPOSITION: Home or Self Care    FINAL DIAGNOSIS:  <principal problem not specified>    FOLLOWUP: In clinic    DISCHARGE INSTRUCTIONS:    Discharge Procedure Orders   Diet general        TIME SPENT ON DISCHARGE:  minutes

## 2024-03-27 NOTE — PROGRESS NOTES
"    [facility] - Clinic Note  Joe Lynne MD      Patient Name: Peng Reed                   : 1960      MRN: [id number]   Visit Date: 2024       History Present Illness     Reason for Visit: Arterial Disease    Mr. Reed presents to the clinic for 1 week follow up to discuss options for lower extremity revascularization. He is s/p diagnostic angiogram on 3/25/24. He is s/p left keqwrah-en-nfxyrcocv bypass 21 and right lvgowkn-il-mxzdugfum bypass 3/4/21 and has now developed rest pain in the left leg.  His symptoms are worse at night when he lays down in bed.  He was angiogram showed a left external iliac occlusion with reconstitution of the common femoral which is patent to the profunda.  He was an SFA occlusion as well as a proximal and distal popliteal occlusion with an isolated popliteal segment mid leg.  Beyond this he does have three-vessel runoff in the left lower extremity.  On the right, he was a long segment SFA and popliteal occlusion with reconstitution right at the knee joint.  Both of his previous bypass grafts have occluded.  He does still continue to smoke.  He has started a daily aspirin.  He was yet to be set up with the primary care provider    REVIEW OF SYSTEMS:  12 point review of systems conducted, negative except as stated in the history of present illness. See HPI for details.        Physical Exam      Vitals:    24 0910 24 0912   BP: (!) 134/91 (!) 130/90   BP Location: Right arm Left arm   Pulse: 90 97   Weight: 68.5 kg (151 lb)    Height: 5' 9" (1.753 m)           General: well-nourished, no acute distress, and healthy appearing, alert, pleasant, conversant, and oriented  Neurologic: cranial nerves are grossly intact, no neurologic deficits, no motor deficits, and no sensory deficits  Neck/Chest: normal , soft without lymphadenopathy, and no carotid bruits noted  Respiratory: breathing easily, without respiratory distress, and normal breath " sounds  Abdomen: normal and soft  Cardiology: regular rate and rhythm and no audible murmur    Upper Extremity Arterial Exam:   Right - radial is palpable and brachial is palpable  Left - radial is palpable and brachial is palpable    Lower Extremity Arterial Exam:  Right - posterior tibial is non-palpable and dorsalis pedis is non-palpable  Left - posterior tibial is non-palpable and dorsalis pedis is non-palpable    Musculoskeletal:   Lower Extremity: no edema present to bilateral lower extremities            Assessment and Plan     Mr. Reed is a 63 y.o. male with left lower extremity rest pain.  I discussed the significance of his peripheral vascular disease especially as it relates to his relatively young age.  I explained to him that he absolutely has to quit smoking at this point.  Also started him on a statin until he can be set up for a long-term management with primary care doctor.  We discussed options for revascularization of the left lower extremity.  At this point I think due to his severe peripheral vascular disease and his previous interventions, proceeding with revascularization of his inflow only is likely the best option.  We discussed performing a common femoral endarterectomy and attempting to angioplasty and stent the external iliac artery.  I did discuss with him that this may require coming from the arm.  I also discussed that if I can not succeed in crossing the external iliac into the native vessel, require a crossover fem-fem bypass.  I explained the procedure and risks and benefits to him.  I also explained to him that this it was designed only to get him out of rest pain at this point.  His options for revascularization of his tibials are significantly limited as he was no adequate saphenous vein and he was already had previous interventions in the left leg.  I also explained that if we do have to perform a fem-fem bypass, this would later limit options for revascularization to both legs  distally if required.  He understands and would like to proceed.      1. Atherosclerosis of native arteries of extremities with rest pain, left leg    2. Atherosclerosis of native arteries of extremities with rest pain, right leg    3. S/P femoral-popliteal bypass surgery    4. Smoker          Imaging Obtained/Reviewed   Study: peripheral angiogram  Date:   3/22/24        Medical History     Past Medical History:   Diagnosis Date    Atherosclerosis of native arteries of extremities with intermittent claudication, unspecified extremity     Colon cancer     Eczema     Gout, unspecified     Heart murmur     Hip pain, acute, left     PAD (peripheral artery disease)     Prostate cancer      Past Surgical History:   Procedure Laterality Date    ARTERIOGRAPHY  03/25/2024    Dr. Lynne    FEMORAL BYPASS Left 01/13/2021    Surgeon: Dr. Motley    FEMORAL BYPASS Right 03/04/2021    Surgeon: Dr. Motley    FLUOROSCOPIC ANGIOGRAPHY OF LOWER EXTREMITY WITH TOPICAL ULTRASOUND Bilateral 3/25/2024    Procedure: ARTERIOGRAM-LEG AND ULTRASOUND;  Surgeon: Joe Lynne MD;  Location: [facility] CATH LAB;  Service: Peripheral Vascular;  Laterality: Bilateral;  bilateral lower extremity angiogram via left arm access    HIP ARTHROPLASTY Left     PROSTATE BIOPSY       History reviewed. No pertinent family history.  Social History     Socioeconomic History    Marital status:    Tobacco Use    Smoking status: Every Day     Current packs/day: 0.50     Average packs/day: 0.5 packs/day for 44.2 years (22.1 ttl pk-yrs)     Types: Cigarettes     Start date: 1980    Smokeless tobacco: Never    Tobacco comments:     1 pack/week   Substance and Sexual Activity    Alcohol use: Yes     Comment: Occasionally    Drug use: Never     Current Outpatient Medications   Medication Instructions    aspirin (ECOTRIN) 81 mg, Oral, Daily    traMADoL (ULTRAM) 50 mg, Oral     Review of patient's allergies indicates:  No Known Allergies    Patient Care  Team:  No, Primary Doctor as PCP - General  Anderson Zelaya DPM as Consulting Physician (Podiatry)  Danette Motley MD as Consulting Physician (Cardiothoracic Surgery)        No follow-ups on file. In addition to their scheduled follow up, the patient has also been instructed to follow up on as needed basis.     No future appointments.

## 2024-03-27 NOTE — H&P (VIEW-ONLY)
"    [facility] Vascular - Clinic Note  Joe Lynne MD      Patient Name: Peng Reed                   : 1960      MRN: [id number]   Visit Date: 2024       History Present Illness     Reason for Visit: Arterial Disease    Mr. Reed presents to the clinic for 1 week follow up to discuss options for lower extremity revascularization. He is s/p diagnostic angiogram on 3/25/24. He is s/p left iutzamk-gj-chhmlipmg bypass 21 and right rvcbokt-wj-iwkbadavu bypass 3/4/21 and has now developed rest pain in the left leg.  His symptoms are worse at night when he lays down in bed.  He was angiogram showed a left external iliac occlusion with reconstitution of the common femoral which is patent to the profunda.  He was an SFA occlusion as well as a proximal and distal popliteal occlusion with an isolated popliteal segment mid leg.  Beyond this he does have three-vessel runoff in the left lower extremity.  On the right, he was a long segment SFA and popliteal occlusion with reconstitution right at the knee joint.  Both of his previous bypass grafts have occluded.  He does still continue to smoke.  He has started a daily aspirin.  He was yet to be set up with the primary care provider    REVIEW OF SYSTEMS:  12 point review of systems conducted, negative except as stated in the history of present illness. See HPI for details.        Physical Exam      Vitals:    24 0910 24 0912   BP: (!) 134/91 (!) 130/90   BP Location: Right arm Left arm   Pulse: 90 97   Weight: 68.5 kg (151 lb)    Height: 5' 9" (1.753 m)           General: well-nourished, no acute distress, and healthy appearing, alert, pleasant, conversant, and oriented  Neurologic: cranial nerves are grossly intact, no neurologic deficits, no motor deficits, and no sensory deficits  Neck/Chest: normal , soft without lymphadenopathy, and no carotid bruits noted  Respiratory: breathing easily, without respiratory distress, and normal breath " sounds  Abdomen: normal and soft  Cardiology: regular rate and rhythm and no audible murmur    Upper Extremity Arterial Exam:   Right - radial is palpable and brachial is palpable  Left - radial is palpable and brachial is palpable    Lower Extremity Arterial Exam:  Right - posterior tibial is non-palpable and dorsalis pedis is non-palpable  Left - posterior tibial is non-palpable and dorsalis pedis is non-palpable    Musculoskeletal:   Lower Extremity: no edema present to bilateral lower extremities            Assessment and Plan     Mr. Reed is a 63 y.o. male with left lower extremity rest pain.  I discussed the significance of his peripheral vascular disease especially as it relates to his relatively young age.  I explained to him that he absolutely has to quit smoking at this point.  Also started him on a statin until he can be set up for a long-term management with primary care doctor.  We discussed options for revascularization of the left lower extremity.  At this point I think due to his severe peripheral vascular disease and his previous interventions, proceeding with revascularization of his inflow only is likely the best option.  We discussed performing a common femoral endarterectomy and attempting to angioplasty and stent the external iliac artery.  I did discuss with him that this may require coming from the arm.  I also discussed that if I can not succeed in crossing the external iliac into the native vessel, require a crossover fem-fem bypass.  I explained the procedure and risks and benefits to him.  I also explained to him that this it was designed only to get him out of rest pain at this point.  His options for revascularization of his tibials are significantly limited as he was no adequate saphenous vein and he was already had previous interventions in the left leg.  I also explained that if we do have to perform a fem-fem bypass, this would later limit options for revascularization to both legs  distally if required.  He understands and would like to proceed.      1. Atherosclerosis of native arteries of extremities with rest pain, left leg    2. Atherosclerosis of native arteries of extremities with rest pain, right leg    3. S/P femoral-popliteal bypass surgery    4. Smoker          Imaging Obtained/Reviewed   Study: peripheral angiogram  Date:   3/22/24        Medical History     Past Medical History:   Diagnosis Date    Atherosclerosis of native arteries of extremities with intermittent claudication, unspecified extremity     Colon cancer     Eczema     Gout, unspecified     Heart murmur     Hip pain, acute, left     PAD (peripheral artery disease)     Prostate cancer      Past Surgical History:   Procedure Laterality Date    ARTERIOGRAPHY  03/25/2024    Dr. Lynne    FEMORAL BYPASS Left 01/13/2021    Surgeon: Dr. Motley    FEMORAL BYPASS Right 03/04/2021    Surgeon: Dr. Motley    FLUOROSCOPIC ANGIOGRAPHY OF LOWER EXTREMITY WITH TOPICAL ULTRASOUND Bilateral 3/25/2024    Procedure: ARTERIOGRAM-LEG AND ULTRASOUND;  Surgeon: Joe Lynne MD;  Location: [facility] CATH LAB;  Service: Peripheral Vascular;  Laterality: Bilateral;  bilateral lower extremity angiogram via left arm access    HIP ARTHROPLASTY Left     PROSTATE BIOPSY       History reviewed. No pertinent family history.  Social History     Socioeconomic History    Marital status:    Tobacco Use    Smoking status: Every Day     Current packs/day: 0.50     Average packs/day: 0.5 packs/day for 44.2 years (22.1 ttl pk-yrs)     Types: Cigarettes     Start date: 1980    Smokeless tobacco: Never    Tobacco comments:     1 pack/week   Substance and Sexual Activity    Alcohol use: Yes     Comment: Occasionally    Drug use: Never     Current Outpatient Medications   Medication Instructions    aspirin (ECOTRIN) 81 mg, Oral, Daily    traMADoL (ULTRAM) 50 mg, Oral     Review of patient's allergies indicates:  No Known Allergies    Patient Care  Team:  No, Primary Doctor as PCP - General  Anderson Zelaya DPM as Consulting Physician (Podiatry)  Danette Motley MD as Consulting Physician (Cardiothoracic Surgery)        No follow-ups on file. In addition to their scheduled follow up, the patient has also been instructed to follow up on as needed basis.     No future appointments.

## 2024-03-28 ENCOUNTER — TELEPHONE (OUTPATIENT)
Dept: VASCULAR SURGERY | Facility: CLINIC | Age: 64
End: 2024-03-28
Payer: MEDICARE

## 2024-04-01 ENCOUNTER — OFFICE VISIT (OUTPATIENT)
Dept: VASCULAR SURGERY | Facility: CLINIC | Age: 64
End: 2024-04-01
Payer: MEDICARE

## 2024-04-01 VITALS
SYSTOLIC BLOOD PRESSURE: 130 MMHG | WEIGHT: 151 LBS | HEIGHT: 69 IN | DIASTOLIC BLOOD PRESSURE: 90 MMHG | BODY MASS INDEX: 22.36 KG/M2 | HEART RATE: 97 BPM

## 2024-04-01 DIAGNOSIS — Z95.828 S/P FEMORAL-POPLITEAL BYPASS SURGERY: ICD-10-CM

## 2024-04-01 DIAGNOSIS — F17.200 SMOKER: ICD-10-CM

## 2024-04-01 DIAGNOSIS — I70.222 ATHEROSCLEROSIS OF NATIVE ARTERIES OF EXTREMITIES WITH REST PAIN, LEFT LEG: Primary | ICD-10-CM

## 2024-04-01 DIAGNOSIS — I70.221 ATHEROSCLEROSIS OF NATIVE ARTERIES OF EXTREMITIES WITH REST PAIN, RIGHT LEG: ICD-10-CM

## 2024-04-01 PROCEDURE — 3008F BODY MASS INDEX DOCD: CPT | Mod: CPTII,,, | Performed by: SURGERY

## 2024-04-01 PROCEDURE — 1160F RVW MEDS BY RX/DR IN RCRD: CPT | Mod: CPTII,,, | Performed by: SURGERY

## 2024-04-01 PROCEDURE — 99214 OFFICE O/P EST MOD 30 MIN: CPT | Mod: ,,, | Performed by: SURGERY

## 2024-04-01 PROCEDURE — 3080F DIAST BP >= 90 MM HG: CPT | Mod: CPTII,,, | Performed by: SURGERY

## 2024-04-01 PROCEDURE — 1159F MED LIST DOCD IN RCRD: CPT | Mod: CPTII,,, | Performed by: SURGERY

## 2024-04-01 PROCEDURE — 3075F SYST BP GE 130 - 139MM HG: CPT | Mod: CPTII,,, | Performed by: SURGERY

## 2024-04-01 RX ORDER — SODIUM CHLORIDE 9 MG/ML
INJECTION, SOLUTION INTRAVENOUS CONTINUOUS
Status: CANCELLED | OUTPATIENT
Start: 2024-04-18

## 2024-04-01 RX ORDER — TRAMADOL HYDROCHLORIDE 50 MG/1
50 TABLET ORAL EVERY 6 HOURS
Qty: 15 TABLET | Refills: 0 | Status: ON HOLD | OUTPATIENT
Start: 2024-04-01 | End: 2024-04-19 | Stop reason: HOSPADM

## 2024-04-01 RX ORDER — CILOSTAZOL 50 MG/1
50 TABLET ORAL 2 TIMES DAILY
Qty: 60 TABLET | Refills: 11 | Status: SHIPPED | OUTPATIENT
Start: 2024-04-01 | End: 2025-04-01

## 2024-04-01 RX ORDER — ATORVASTATIN CALCIUM 40 MG/1
40 TABLET, FILM COATED ORAL DAILY
Qty: 90 TABLET | Refills: 3 | Status: SHIPPED | OUTPATIENT
Start: 2024-04-01 | End: 2025-04-01

## 2024-04-09 ENCOUNTER — ANESTHESIA EVENT (OUTPATIENT)
Dept: SURGERY | Facility: HOSPITAL | Age: 64
DRG: 271 | End: 2024-04-09
Payer: MEDICARE

## 2024-04-12 ENCOUNTER — HOSPITAL ENCOUNTER (OUTPATIENT)
Dept: RADIOLOGY | Facility: HOSPITAL | Age: 64
Discharge: HOME | End: 2024-04-12
Attending: SURGERY
Payer: MEDICARE

## 2024-04-12 DIAGNOSIS — I70.222 ATHEROSCLEROSIS OF NATIVE ARTERIES OF EXTREMITIES WITH REST PAIN, LEFT LEG: ICD-10-CM

## 2024-04-12 PROCEDURE — 71046 X-RAY EXAM CHEST 2 VIEWS: CPT | Mod: TC

## 2024-04-17 ENCOUNTER — TELEPHONE (OUTPATIENT)
Dept: VASCULAR SURGERY | Facility: CLINIC | Age: 64
End: 2024-04-17
Payer: MEDICARE

## 2024-04-18 ENCOUNTER — ANESTHESIA (OUTPATIENT)
Dept: SURGERY | Facility: HOSPITAL | Age: 64
DRG: 271 | End: 2024-04-18
Payer: MEDICARE

## 2024-04-18 ENCOUNTER — HOSPITAL ENCOUNTER (INPATIENT)
Facility: HOSPITAL | Age: 64
LOS: 1 days | Discharge: HOME | DRG: 271 | End: 2024-04-19
Attending: SURGERY | Admitting: SURGERY
Payer: MEDICARE

## 2024-04-18 DIAGNOSIS — I70.222 ATHEROSCLEROSIS OF NATIVE ARTERIES OF EXTREMITIES WITH REST PAIN, LEFT LEG: Primary | ICD-10-CM

## 2024-04-18 LAB
GROUP & RH: NORMAL
INDIRECT COOMBS: NORMAL
SPECIMEN OUTDATE: NORMAL

## 2024-04-18 PROCEDURE — 37000009 HC ANESTHESIA EA ADD 15 MINS: Performed by: SURGERY

## 2024-04-18 PROCEDURE — C1769 GUIDE WIRE: HCPCS | Performed by: SURGERY

## 2024-04-18 PROCEDURE — 11000001 HC ACUTE MED/SURG PRIVATE ROOM

## 2024-04-18 PROCEDURE — C1725 CATH, TRANSLUMIN NON-LASER: HCPCS | Performed by: SURGERY

## 2024-04-18 PROCEDURE — 36620 INSERTION CATHETER ARTERY: CPT | Performed by: ANESTHESIOLOGY

## 2024-04-18 PROCEDURE — 86850 RBC ANTIBODY SCREEN: CPT | Performed by: SURGERY

## 2024-04-18 PROCEDURE — 36000707: Performed by: SURGERY

## 2024-04-18 PROCEDURE — 88304 TISSUE EXAM BY PATHOLOGIST: CPT | Performed by: SURGERY

## 2024-04-18 PROCEDURE — 63600175 PHARM REV CODE 636 W HCPCS: Performed by: ANESTHESIOLOGY

## 2024-04-18 PROCEDURE — 04UL0KZ SUPPLEMENT LEFT FEMORAL ARTERY WITH NONAUTOLOGOUS TISSUE SUBSTITUTE, OPEN APPROACH: ICD-10-PCS | Performed by: SURGERY

## 2024-04-18 PROCEDURE — 04CL0ZZ EXTIRPATION OF MATTER FROM LEFT FEMORAL ARTERY, OPEN APPROACH: ICD-10-PCS | Performed by: SURGERY

## 2024-04-18 PROCEDURE — 04CC3ZZ EXTIRPATION OF MATTER FROM RIGHT COMMON ILIAC ARTERY, PERCUTANEOUS APPROACH: ICD-10-PCS | Performed by: SURGERY

## 2024-04-18 PROCEDURE — 25000003 PHARM REV CODE 250: Performed by: SURGERY

## 2024-04-18 PROCEDURE — 25500020 PHARM REV CODE 255: Performed by: SURGERY

## 2024-04-18 PROCEDURE — 63600175 PHARM REV CODE 636 W HCPCS: Performed by: NURSE ANESTHETIST, CERTIFIED REGISTERED

## 2024-04-18 PROCEDURE — 63600175 PHARM REV CODE 636 W HCPCS: Performed by: SURGERY

## 2024-04-18 PROCEDURE — D9220A PRA ANESTHESIA: Mod: CRNA,,, | Performed by: NURSE ANESTHETIST, CERTIFIED REGISTERED

## 2024-04-18 PROCEDURE — 27800903 OPTIME MED/SURG SUP & DEVICES OTHER IMPLANTS: Performed by: SURGERY

## 2024-04-18 PROCEDURE — 36000706: Performed by: SURGERY

## 2024-04-18 PROCEDURE — 047J3DZ DILATION OF LEFT EXTERNAL ILIAC ARTERY WITH INTRALUMINAL DEVICE, PERCUTANEOUS APPROACH: ICD-10-PCS | Performed by: SURGERY

## 2024-04-18 PROCEDURE — 27201423 OPTIME MED/SURG SUP & DEVICES STERILE SUPPLY: Performed by: SURGERY

## 2024-04-18 PROCEDURE — B41G1ZZ FLUOROSCOPY OF LEFT LOWER EXTREMITY ARTERIES USING LOW OSMOLAR CONTRAST: ICD-10-PCS | Performed by: SURGERY

## 2024-04-18 PROCEDURE — P9047 ALBUMIN (HUMAN), 25%, 50ML: HCPCS | Mod: JZ,JG | Performed by: NURSE ANESTHETIST, CERTIFIED REGISTERED

## 2024-04-18 PROCEDURE — 71000033 HC RECOVERY, INTIAL HOUR: Performed by: SURGERY

## 2024-04-18 PROCEDURE — 25000003 PHARM REV CODE 250: Performed by: ANESTHESIOLOGY

## 2024-04-18 PROCEDURE — D9220A PRA ANESTHESIA: Mod: ANES,,, | Performed by: ANESTHESIOLOGY

## 2024-04-18 PROCEDURE — 71000039 HC RECOVERY, EACH ADD'L HOUR: Performed by: SURGERY

## 2024-04-18 PROCEDURE — C1894 INTRO/SHEATH, NON-LASER: HCPCS | Performed by: SURGERY

## 2024-04-18 PROCEDURE — 25000003 PHARM REV CODE 250: Performed by: NURSE ANESTHETIST, CERTIFIED REGISTERED

## 2024-04-18 PROCEDURE — C1874 STENT, COATED/COV W/DEL SYS: HCPCS | Performed by: SURGERY

## 2024-04-18 PROCEDURE — C1887 CATHETER, GUIDING: HCPCS | Performed by: SURGERY

## 2024-04-18 PROCEDURE — 37000008 HC ANESTHESIA 1ST 15 MINUTES: Performed by: SURGERY

## 2024-04-18 PROCEDURE — C1757 CATH, THROMBECTOMY/EMBOLECT: HCPCS | Performed by: SURGERY

## 2024-04-18 PROCEDURE — C1730 CATH, EP, 19 OR FEW ELECT: HCPCS | Performed by: SURGERY

## 2024-04-18 PROCEDURE — 35371 RECHANNELING OF ARTERY: CPT | Mod: LT,,, | Performed by: SURGERY

## 2024-04-18 DEVICE — IMPLANTABLE DEVICE: Type: IMPLANTABLE DEVICE | Site: FEMORAL | Status: FUNCTIONAL

## 2024-04-18 DEVICE — VIABAHN SX ENDO HEPARIN 35 RO 8MMX10CM 8FR 120CMCATH
Type: IMPLANTABLE DEVICE | Site: FEMORAL | Status: FUNCTIONAL
Brand: GORE VIABAHN ENDOPROSTHESIS WITH HEPARIN

## 2024-04-18 RX ORDER — LIDOCAINE HYDROCHLORIDE 10 MG/ML
1 INJECTION, SOLUTION EPIDURAL; INFILTRATION; INTRACAUDAL; PERINEURAL ONCE
Status: DISCONTINUED | OUTPATIENT
Start: 2024-04-18 | End: 2024-04-18 | Stop reason: HOSPADM

## 2024-04-18 RX ORDER — TRAMADOL HYDROCHLORIDE 50 MG/1
50 TABLET ORAL
Status: COMPLETED | OUTPATIENT
Start: 2024-04-18 | End: 2024-04-18

## 2024-04-18 RX ORDER — SODIUM CHLORIDE, SODIUM LACTATE, POTASSIUM CHLORIDE, CALCIUM CHLORIDE 600; 310; 30; 20 MG/100ML; MG/100ML; MG/100ML; MG/100ML
INJECTION, SOLUTION INTRAVENOUS CONTINUOUS
Status: DISCONTINUED | OUTPATIENT
Start: 2024-04-18 | End: 2024-04-19 | Stop reason: HOSPADM

## 2024-04-18 RX ORDER — ALBUMIN HUMAN 250 G/1000ML
SOLUTION INTRAVENOUS
Status: DISCONTINUED | OUTPATIENT
Start: 2024-04-18 | End: 2024-04-18

## 2024-04-18 RX ORDER — SODIUM CHLORIDE 9 MG/ML
INJECTION, SOLUTION INTRAVENOUS CONTINUOUS
Status: DISCONTINUED | OUTPATIENT
Start: 2024-04-18 | End: 2024-04-19 | Stop reason: HOSPADM

## 2024-04-18 RX ORDER — FENTANYL CITRATE 50 UG/ML
INJECTION, SOLUTION INTRAMUSCULAR; INTRAVENOUS
Status: DISCONTINUED | OUTPATIENT
Start: 2024-04-18 | End: 2024-04-18

## 2024-04-18 RX ORDER — HYDROMORPHONE HYDROCHLORIDE 2 MG/ML
0.4 INJECTION, SOLUTION INTRAMUSCULAR; INTRAVENOUS; SUBCUTANEOUS EVERY 5 MIN PRN
Status: DISCONTINUED | OUTPATIENT
Start: 2024-04-18 | End: 2024-04-18 | Stop reason: HOSPADM

## 2024-04-18 RX ORDER — CEFAZOLIN SODIUM 2 G/50ML
2 SOLUTION INTRAVENOUS
Status: COMPLETED | OUTPATIENT
Start: 2024-04-18 | End: 2024-04-18

## 2024-04-18 RX ORDER — ACETAMINOPHEN 500 MG
1000 TABLET ORAL
Status: COMPLETED | OUTPATIENT
Start: 2024-04-18 | End: 2024-04-18

## 2024-04-18 RX ORDER — PROCHLORPERAZINE EDISYLATE 5 MG/ML
5 INJECTION INTRAMUSCULAR; INTRAVENOUS EVERY 30 MIN PRN
Status: DISCONTINUED | OUTPATIENT
Start: 2024-04-18 | End: 2024-04-18 | Stop reason: HOSPADM

## 2024-04-18 RX ORDER — PROTAMINE SULFATE 10 MG/ML
INJECTION, SOLUTION INTRAVENOUS
Status: DISCONTINUED | OUTPATIENT
Start: 2024-04-18 | End: 2024-04-18

## 2024-04-18 RX ORDER — MORPHINE SULFATE 10 MG/ML
2 INJECTION INTRAMUSCULAR; INTRAVENOUS; SUBCUTANEOUS
Status: DISCONTINUED | OUTPATIENT
Start: 2024-04-18 | End: 2024-04-19 | Stop reason: HOSPADM

## 2024-04-18 RX ORDER — ROCURONIUM BROMIDE 10 MG/ML
INJECTION, SOLUTION INTRAVENOUS
Status: DISCONTINUED | OUTPATIENT
Start: 2024-04-18 | End: 2024-04-18

## 2024-04-18 RX ORDER — GABAPENTIN 300 MG/1
300 CAPSULE ORAL
Status: COMPLETED | OUTPATIENT
Start: 2024-04-18 | End: 2024-04-18

## 2024-04-18 RX ORDER — NAPROXEN SODIUM 220 MG/1
81 TABLET, FILM COATED ORAL DAILY
Status: DISCONTINUED | OUTPATIENT
Start: 2024-04-19 | End: 2024-04-19 | Stop reason: HOSPADM

## 2024-04-18 RX ORDER — CEFAZOLIN SODIUM 1 G/3ML
INJECTION, POWDER, FOR SOLUTION INTRAMUSCULAR; INTRAVENOUS
Status: DISCONTINUED | OUTPATIENT
Start: 2024-04-18 | End: 2024-04-18 | Stop reason: HOSPADM

## 2024-04-18 RX ORDER — PROPOFOL 10 MG/ML
VIAL (ML) INTRAVENOUS
Status: DISCONTINUED | OUTPATIENT
Start: 2024-04-18 | End: 2024-04-18

## 2024-04-18 RX ORDER — SODIUM CHLORIDE, SODIUM GLUCONATE, SODIUM ACETATE, POTASSIUM CHLORIDE AND MAGNESIUM CHLORIDE 30; 37; 368; 526; 502 MG/100ML; MG/100ML; MG/100ML; MG/100ML; MG/100ML
INJECTION, SOLUTION INTRAVENOUS CONTINUOUS
Status: DISCONTINUED | OUTPATIENT
Start: 2024-04-18 | End: 2024-04-19 | Stop reason: HOSPADM

## 2024-04-18 RX ORDER — HYDROCODONE BITARTRATE AND ACETAMINOPHEN 5; 325 MG/1; MG/1
1 TABLET ORAL
Status: DISCONTINUED | OUTPATIENT
Start: 2024-04-18 | End: 2024-04-19 | Stop reason: HOSPADM

## 2024-04-18 RX ORDER — DEXAMETHASONE SODIUM PHOSPHATE 4 MG/ML
INJECTION, SOLUTION INTRA-ARTICULAR; INTRALESIONAL; INTRAMUSCULAR; INTRAVENOUS; SOFT TISSUE
Status: DISCONTINUED | OUTPATIENT
Start: 2024-04-18 | End: 2024-04-18

## 2024-04-18 RX ORDER — ONDANSETRON 4 MG/1
4 TABLET, ORALLY DISINTEGRATING ORAL
Status: COMPLETED | OUTPATIENT
Start: 2024-04-18 | End: 2024-04-18

## 2024-04-18 RX ORDER — ONDANSETRON HYDROCHLORIDE 2 MG/ML
INJECTION, SOLUTION INTRAVENOUS
Status: DISCONTINUED | OUTPATIENT
Start: 2024-04-18 | End: 2024-04-18

## 2024-04-18 RX ORDER — ONDANSETRON HYDROCHLORIDE 2 MG/ML
4 INJECTION, SOLUTION INTRAVENOUS DAILY PRN
Status: DISCONTINUED | OUTPATIENT
Start: 2024-04-18 | End: 2024-04-18 | Stop reason: HOSPADM

## 2024-04-18 RX ORDER — CEFAZOLIN SODIUM 2 G/50ML
2 SOLUTION INTRAVENOUS
Status: COMPLETED | OUTPATIENT
Start: 2024-04-18 | End: 2024-04-19

## 2024-04-18 RX ORDER — HYDROCODONE BITARTRATE AND ACETAMINOPHEN 5; 325 MG/1; MG/1
1 TABLET ORAL EVERY 4 HOURS PRN
Status: DISCONTINUED | OUTPATIENT
Start: 2024-04-18 | End: 2024-04-19 | Stop reason: HOSPADM

## 2024-04-18 RX ORDER — MIDAZOLAM HYDROCHLORIDE 2 MG/2ML
2 INJECTION, SOLUTION INTRAMUSCULAR; INTRAVENOUS ONCE AS NEEDED
Status: COMPLETED | OUTPATIENT
Start: 2024-04-18 | End: 2024-04-18

## 2024-04-18 RX ORDER — HEPARIN SODIUM 1000 [USP'U]/ML
INJECTION, SOLUTION INTRAVENOUS; SUBCUTANEOUS
Status: DISCONTINUED | OUTPATIENT
Start: 2024-04-18 | End: 2024-04-18

## 2024-04-18 RX ORDER — PHENYLEPHRINE HYDROCHLORIDE 10 MG/ML
INJECTION INTRAVENOUS
Status: DISCONTINUED | OUTPATIENT
Start: 2024-04-18 | End: 2024-04-18

## 2024-04-18 RX ORDER — ATORVASTATIN CALCIUM 40 MG/1
40 TABLET, FILM COATED ORAL DAILY
Status: DISCONTINUED | OUTPATIENT
Start: 2024-04-19 | End: 2024-04-19 | Stop reason: HOSPADM

## 2024-04-18 RX ORDER — MUPIROCIN 20 MG/G
OINTMENT TOPICAL 2 TIMES DAILY
Status: DISCONTINUED | OUTPATIENT
Start: 2024-04-18 | End: 2024-04-19 | Stop reason: HOSPADM

## 2024-04-18 RX ORDER — LIDOCAINE HYDROCHLORIDE 20 MG/ML
INJECTION, SOLUTION EPIDURAL; INFILTRATION; INTRACAUDAL; PERINEURAL
Status: DISCONTINUED | OUTPATIENT
Start: 2024-04-18 | End: 2024-04-18

## 2024-04-18 RX ORDER — MEPERIDINE HYDROCHLORIDE 25 MG/ML
6.25 INJECTION INTRAMUSCULAR; INTRAVENOUS; SUBCUTANEOUS ONCE AS NEEDED
Status: DISCONTINUED | OUTPATIENT
Start: 2024-04-18 | End: 2024-04-18 | Stop reason: HOSPADM

## 2024-04-18 RX ADMIN — PROPOFOL 30 MG: 10 INJECTION, EMULSION INTRAVENOUS at 02:04

## 2024-04-18 RX ADMIN — DEXAMETHASONE SODIUM PHOSPHATE 4 MG: 4 INJECTION, SOLUTION INTRA-ARTICULAR; INTRALESIONAL; INTRAMUSCULAR; INTRAVENOUS; SOFT TISSUE at 02:04

## 2024-04-18 RX ADMIN — CEFAZOLIN SODIUM 2 G: 2 SOLUTION INTRAVENOUS at 02:04

## 2024-04-18 RX ADMIN — ONDANSETRON 4 MG: 4 TABLET, ORALLY DISINTEGRATING ORAL at 10:04

## 2024-04-18 RX ADMIN — FENTANYL CITRATE 25 MCG: 50 INJECTION, SOLUTION INTRAMUSCULAR; INTRAVENOUS at 05:04

## 2024-04-18 RX ADMIN — ALBUMIN (HUMAN) 100 ML: 12.5 SOLUTION INTRAVENOUS at 04:04

## 2024-04-18 RX ADMIN — CEFAZOLIN SODIUM 2 G: 2 SOLUTION INTRAVENOUS at 09:04

## 2024-04-18 RX ADMIN — GABAPENTIN 300 MG: 300 CAPSULE ORAL at 10:04

## 2024-04-18 RX ADMIN — HEPARIN SODIUM 2000 UNITS: 1000 INJECTION, SOLUTION INTRAVENOUS; SUBCUTANEOUS at 03:04

## 2024-04-18 RX ADMIN — HYDROMORPHONE HYDROCHLORIDE 0.4 MG: 2 INJECTION INTRAMUSCULAR; INTRAVENOUS; SUBCUTANEOUS at 07:04

## 2024-04-18 RX ADMIN — MUPIROCIN: 20 OINTMENT TOPICAL at 09:04

## 2024-04-18 RX ADMIN — ROCURONIUM BROMIDE 60 MG: 10 SOLUTION INTRAVENOUS at 02:04

## 2024-04-18 RX ADMIN — ROCURONIUM BROMIDE 30 MG: 10 SOLUTION INTRAVENOUS at 03:04

## 2024-04-18 RX ADMIN — PROPOFOL 140 MG: 10 INJECTION, EMULSION INTRAVENOUS at 02:04

## 2024-04-18 RX ADMIN — HYDROMORPHONE HYDROCHLORIDE 0.4 MG: 2 INJECTION INTRAMUSCULAR; INTRAVENOUS; SUBCUTANEOUS at 06:04

## 2024-04-18 RX ADMIN — ALBUMIN (HUMAN) 100 ML: 12.5 SOLUTION INTRAVENOUS at 02:04

## 2024-04-18 RX ADMIN — SODIUM CHLORIDE, SODIUM GLUCONATE, SODIUM ACETATE, POTASSIUM CHLORIDE AND MAGNESIUM CHLORIDE: 526; 502; 368; 37; 30 INJECTION, SOLUTION INTRAVENOUS at 03:04

## 2024-04-18 RX ADMIN — PHENYLEPHRINE HYDROCHLORIDE 100 MCG: 10 INJECTION INTRAVENOUS at 02:04

## 2024-04-18 RX ADMIN — MIDAZOLAM HYDROCHLORIDE 2 MG: 1 INJECTION, SOLUTION INTRAMUSCULAR; INTRAVENOUS at 01:04

## 2024-04-18 RX ADMIN — LIDOCAINE HYDROCHLORIDE 80 MG: 20 INJECTION, SOLUTION EPIDURAL; INFILTRATION; INTRACAUDAL; PERINEURAL at 02:04

## 2024-04-18 RX ADMIN — SODIUM CHLORIDE, SODIUM GLUCONATE, SODIUM ACETATE, POTASSIUM CHLORIDE AND MAGNESIUM CHLORIDE: 526; 502; 368; 37; 30 INJECTION, SOLUTION INTRAVENOUS at 02:04

## 2024-04-18 RX ADMIN — ACETAMINOPHEN 1000 MG: 500 TABLET ORAL at 10:04

## 2024-04-18 RX ADMIN — HYDROCODONE BITARTRATE AND ACETAMINOPHEN 1 TABLET: 5; 325 TABLET ORAL at 09:04

## 2024-04-18 RX ADMIN — FENTANYL CITRATE 100 MCG: 50 INJECTION, SOLUTION INTRAMUSCULAR; INTRAVENOUS at 02:04

## 2024-04-18 RX ADMIN — TRAMADOL HYDROCHLORIDE 50 MG: 50 TABLET, COATED ORAL at 10:04

## 2024-04-18 RX ADMIN — ONDANSETRON 4 MG: 2 INJECTION INTRAMUSCULAR; INTRAVENOUS at 05:04

## 2024-04-18 RX ADMIN — PROTAMINE SULFATE 50 MG: 10 INJECTION, SOLUTION INTRAVENOUS at 05:04

## 2024-04-18 RX ADMIN — FENTANYL CITRATE 50 MCG: 50 INJECTION, SOLUTION INTRAMUSCULAR; INTRAVENOUS at 05:04

## 2024-04-18 RX ADMIN — HEPARIN SODIUM 6500 UNITS: 1000 INJECTION, SOLUTION INTRAVENOUS; SUBCUTANEOUS at 03:04

## 2024-04-18 NOTE — ANESTHESIA PROCEDURE NOTES
Intubation    Date/Time: 4/18/2024 2:12 PM    Performed by: Lui Nino CRNA  Authorized by: Buddy Zamora Jr., MD    Intubation:     Induction:  Intravenous    Intubated:  Postinduction    Mask Ventilation:  Easy with oral airway    Attempts:  1    Attempted By:  CRNA    Method of Intubation:  Direct    Blade:  Loyd 2    Laryngeal View Grade: Grade IIA - cords partially seen      Difficult Airway Encountered?: No      Complications:  None    Airway Device:  Oral endotracheal tube    Airway Device Size:  8.0    Style/Cuff Inflation:  Cuffed (inflated to minimal occlusive pressure)    Inflation Amount (mL):  6    Tube secured:  23    Secured at:  The lips    Placement Verified By:  Capnometry    Complicating Factors:  None    Findings Post-Intubation:  BS equal bilateral

## 2024-04-18 NOTE — TRANSFER OF CARE
"Anesthesia Transfer of Care Note    Patient: Peng Reed    Procedure(s) Performed: Procedure(s) (LRB):  ENDARTERECTOMY, FEMORAL (Left)  ARTERIOGRAM-LEG AND ULTRASOUND (Left)  PTA, ARTERY, FEMOROPOPLITEAL, WITH STENT INSERTION (Left)  EMBOLECTOMY (Right)    Patient location: PACU    Anesthesia Type: general    Transport from OR: Transported from OR on room air with adequate spontaneous ventilation    Post pain: adequate analgesia    Post assessment: no apparent anesthetic complications and tolerated procedure well    Post vital signs: stable    Level of consciousness: awake    Nausea/Vomiting: no nausea/vomiting    Complications: none    Transfer of care protocol was followed      Last vitals: Visit Vitals  /81 (BP Location: Right arm, Patient Position: Lying)   Pulse 81   Temp 36.2 °C (97.2 °F)   Resp 16   Ht 5' 9" (1.753 m)   Wt 70.2 kg (154 lb 12.2 oz)   SpO2 97%   BMI 22.85 kg/m²     "

## 2024-04-18 NOTE — ANESTHESIA PREPROCEDURE EVALUATION
"                                                                                                             04/18/2024  Peng Reed is a 63 y.o., male admitted through outpatient surgery for left common femoral endarterectomy with left external iliac stenting versus follow up possible fem-fem bypass    Transthoracic echo December 2020   EF 50%   No significant valvular abnormalities  RVSP 20    Last 3 sets of Vitals        4/1/2024     9:10 AM 4/8/2024    11:25 AM 4/18/2024     8:30 AM   Vitals - 1 value per visit   SYSTOLIC 134  118   DIASTOLIC 91  79   Pulse 90  86   Temp   36.7 °C (98 °F)   Resp   19   SPO2   98 %   Weight (lb) 151 150    Weight (kg) 68.493 68.04    Height 5' 9" (1.753 m) 5' 9" (1.753 m)    BMI (Calculated) 22.3 22.1          Lab Results   Component Value Date    WBC 5.29 04/12/2024    HGB 12.8 (L) 04/12/2024    HCT 39.4 (L) 04/12/2024    .3 (H) 04/12/2024     04/12/2024          BMP  Lab Results   Component Value Date     04/12/2024    K 4.2 04/12/2024     11/04/2021    CO2 24 04/12/2024    BUN 11.4 04/12/2024    CREATININE 0.85 04/12/2024    CALCIUM 9.7 04/12/2024    ANIONGAP 5 (L) 11/04/2021    ESTGFRAFRICA 113 11/04/2021    EGFRNONAA >60 03/31/2021          Pre-op Assessment    I have reviewed the Patient Summary Reports.    I have reviewed the NPO Status.   I have reviewed the Medications.     Review of Systems  Anesthesia Hx:               Denies Personal Hx of Anesthesia complications.                    Social:  Smoker       Hematology/Oncology:       -- Anemia:                                  Cardiovascular:   Functional Capacity unable to determine         Peripheral Arterial Disease                       Physical Exam  General: Well nourished, Cooperative, Alert and Oriented    Airway:  Mallampati: II   Mouth Opening: Normal  TM Distance: Normal  Tongue: Normal  Neck ROM: Normal ROM    Dental:  Periodontal disease  All teeth chipped missing are " cracked  Chest/Lungs:  Clear to auscultation, Normal Respiratory Rate    Heart:  Rate: Normal  Rhythm: Regular Rhythm        Anesthesia Plan  Type of Anesthesia, risks & benefits discussed:    Anesthesia Type: Gen ETT  Intra-op Monitoring Plan: Standard ASA Monitors and Art Line  Post Op Pain Control Plan: multimodal analgesia and IV/PO Opioids PRN  Induction:  IV  Airway Plan: Direct, Post-Induction  Informed Consent: Informed consent signed with the Patient and all parties understand the risks and agree with anesthesia plan.  All questions answered.   ASA Score: 3  Day of Surgery Review of History & Physical: H&P Update referred to the surgeon/provider.  Anesthesia Plan Notes: Premedication: Midazolam  Special Technique: Preemptive analgesia with Neurontin, zofran, acetaminophen and celebrex  or tramadol  PONV prophylaxis with intraop zofran, decadron       PIV X 2  PACU postop     Ready For Surgery From Anesthesia Perspective.     .

## 2024-04-18 NOTE — ANESTHESIA PROCEDURE NOTES
Arterial    Diagnosis: severe PAD    Patient location during procedure: holding area  Timeout: 4/18/2024 1:40 PM  Procedure end time: 4/18/2024 1:43 PM    Staffing  Authorizing Provider: Buddy Zamora Jr., MD  Performing Provider: Buddy Zamora Jr., MD    Staffing  Performed by: Buddy Zamora Jr., MD  Authorized by: Buddy Zamora Jr., MD    Anesthesiologist was present at the time of the procedure.    Preanesthetic Checklist  Completed: patient identified, IV checked, site marked, risks and benefits discussed, surgical consent, monitors and equipment checked, pre-op evaluation, timeout performed and anesthesia consent givenArterial  Skin Prep: alcohol swabs  Local Infiltration: lidocaine  Orientation: right  Location: radial    Catheter Size: 20 G  Catheter placement by Ultrasound guidance. Heme positive aspiration all ports.   Vessel Caliber: small, patent, compressibility normal  Vascular Doppler:  not done  Needle advanced into vessel with real time Ultrasound guidance.Insertion Attempts: 2  Assessment  Dressing: secured with tape and tegaderm  Patient: Tolerated well

## 2024-04-18 NOTE — OP NOTE
[facility]  Operative Note        Surgery Date:  04/18/2024     Pre-op Diagnosis:    Atherosclerosis of native arteries of extremities with rest pain, left leg [I70.222]     Post-op Diagnosis:  Same     Procedure(s):  One.  Left common femoral endarterectomy with bovine pericardial patch angioplasty   2. Left external iliac angioplasty and stenting using an 8 mm x 10 cm VIABAHN self expanding covered stent   3. Percutaneous embolectomy of the right common iliac.    Indication for procedure: Peng Reed is a 63 y.o. male who has had multiple previous bilateral lower extremity interventions.  He recently occluded his common femoral and external iliac arteries in the left side and developed rest pain.  He would previous failed bypasses in the left lower extremity.  He was taken for revascularization of his inflow for leaving of his rest pain.    Surgeon:  Joe Lynne MD    Assistant:  Nasreen Sotelo MD resident    Circulator: Anna Salas RN  Relief Circulator: Alba Ambrocio RN  Relief Scrub: Kala Jovel; Arleth Vasquez ST  Scrub Person: Michelle Shelton RN     Anesthesia:  General     Findings:  Angiography initially showed occlusion of the left external iliac artery.  Completion angiography showed excellent flow down the left external iliac stent into the common femoral.  Angiography of the right side showed patent collateral flow consistent with his preoperative angiography.  He had good Doppler signals in both feet on completion of the case.    Complications: None     Estimated Blood Loss:  50 mL         Specimens:  Left common femoral plaque    Implants:  Implant Name Type Inv. Item Serial No.  Lot No. LRB No. Used Action   STENT VIABAHN [id number] - [id number] stent peripheral covered- self expanding STENT VIABAHN [id number] [employer]  Left 1 Implanted       Drains: None    Procedure in detail:  After informed consent was obtained, and risks and benefits discussed with  the patient, he was brought to the operating room placed supine.  After adequate general anesthesia obtained, he was prepped and draped in a standard sterile fashion.  Vertical incision was made in the left groin dissection was carried down until the common femoral artery was identified.  It was dissected free from the surrounding structures up above-knee inguinal ligament.  Dissection continued in a caudal direction until the profunda was identified.  I did run to the patient's previous fem-pop bypass graft which was transected from the previous common femoral artery.  The profunda was isolated with a vessel loop as well.  Multiple collaterals were also found and preserved by isolating them with vessel loops.  Percutaneous access of the left common femoral artery was obtained with a micropuncture needle and a wire.  The patient was systemically heparinized.  Then exchanged out for an 035 wire and placed an eight Wolof sheath.  I was able to easily advanced my wire across the occluded external iliac into the aorta.  Angiography showed that I was in the true lumen of the aorta.  At this point I removed the sheath.  The arteriotomy from the percutaneous access was then extended proximally and distally in a longitudinal common femoral artery.  This is extended down to the at this point an endarterectomy of the common the distal external at this point I then replaced the sheath.  A catheter was then placed back up and angiography performed from the aorta.  A 7 mm x 100 mm balloon was then advanced across the external iliac and angioplasty was performed.  I then advanced an 8 mm x 10 cm VIABAHN self expanding stent across the external iliac lesion.  This was advanced to the bifurcation of the common iliac into the internal external.  I then deployed the stent and post dilated it with a 7 mm balloon.  Completion angiography showed stent to be patent, however there was a piece of embolus which embolized up into the aorta  and down to the bifurcation of the right common iliac.  I then advanced my wire down into the right common femoral.  An over-the-wire embolectomy catheter was then used to retrieve the embolus which was brought back down the left common iliac and through the open common femoral.  Once I had good backbleeding I then placed my catheter and performed angiography of the right lower extremity.  This showed patent runoff to the common femoral.  The patient had a known SFA occlusion in his profunda collateral were patent.  At this point the distal external iliac artery just distal to the stent was clamped and the sheath and catheter were removed.  A bovine pericardial patch was then sewn in place using a five 0 Prolene suture.  Just prior to completion of the anastomosis.  Flow was then restored of the leg.  I verified signals in the feet at this point.  Protamine was then given.  The wounds were made hemostatic.  The groin incision was closed in layers using a 2-0 Vicryl deep layer, 3-0 Vicryl deep layer, and a 4-0 Monocryl subcuticular on the skin.  Dressings were placed and the patient was awakened and brought to recovery in stable condition.    Condition: Good    I was present and scrubbed for the entire procedure.

## 2024-04-19 VITALS
TEMPERATURE: 98 F | RESPIRATION RATE: 18 BRPM | OXYGEN SATURATION: 97 % | HEIGHT: 69 IN | SYSTOLIC BLOOD PRESSURE: 134 MMHG | WEIGHT: 152 LBS | HEART RATE: 77 BPM | DIASTOLIC BLOOD PRESSURE: 76 MMHG | BODY MASS INDEX: 22.51 KG/M2

## 2024-04-19 PROBLEM — I70.222 ATHEROSCLEROSIS OF NATIVE ARTERIES OF EXTREMITIES WITH REST PAIN, LEFT LEG: Status: ACTIVE | Noted: 2024-04-19

## 2024-04-19 PROCEDURE — 63600175 PHARM REV CODE 636 W HCPCS: Performed by: SURGERY

## 2024-04-19 PROCEDURE — 25000003 PHARM REV CODE 250: Performed by: SURGERY

## 2024-04-19 RX ORDER — CLOPIDOGREL BISULFATE 75 MG/1
75 TABLET ORAL DAILY
Qty: 30 TABLET | Refills: 11 | Status: SHIPPED | OUTPATIENT
Start: 2024-04-19 | End: 2025-04-19

## 2024-04-19 RX ORDER — HYDROCODONE BITARTRATE AND ACETAMINOPHEN 5; 325 MG/1; MG/1
1 TABLET ORAL EVERY 4 HOURS PRN
Qty: 20 TABLET | Refills: 0 | Status: SHIPPED | OUTPATIENT
Start: 2024-04-19

## 2024-04-19 RX ORDER — CLOPIDOGREL BISULFATE 75 MG/1
75 TABLET ORAL DAILY
Status: DISCONTINUED | OUTPATIENT
Start: 2024-04-19 | End: 2024-04-19 | Stop reason: HOSPADM

## 2024-04-19 RX ORDER — NAPROXEN SODIUM 220 MG/1
81 TABLET, FILM COATED ORAL DAILY
Qty: 360 TABLET | Refills: 0 | Status: SHIPPED | OUTPATIENT
Start: 2024-04-20 | End: 2025-04-20

## 2024-04-19 RX ADMIN — MUPIROCIN: 20 OINTMENT TOPICAL at 08:04

## 2024-04-19 RX ADMIN — CEFAZOLIN SODIUM 2 G: 2 SOLUTION INTRAVENOUS at 05:04

## 2024-04-19 RX ADMIN — CLOPIDOGREL BISULFATE 75 MG: 75 TABLET ORAL at 01:04

## 2024-04-19 RX ADMIN — ATORVASTATIN CALCIUM 40 MG: 40 TABLET, FILM COATED ORAL at 08:04

## 2024-04-19 RX ADMIN — ASPIRIN 81 MG CHEWABLE TABLET 81 MG: 81 TABLET CHEWABLE at 08:04

## 2024-04-19 NOTE — ANESTHESIA POSTPROCEDURE EVALUATION
Anesthesia Post Evaluation    Patient: Peng Reed    Procedure(s) Performed: Procedure(s) (LRB):  ENDARTERECTOMY, FEMORAL (Left)  ARTERIOGRAM-LEG AND ULTRASOUND (Left)  PTA, ARTERY, FEMOROPOPLITEAL, WITH STENT INSERTION (Left)  EMBOLECTOMY (Right)    Final Anesthesia Type: general      Patient location during evaluation: PACU  Patient participation: Yes- Able to Participate  Level of consciousness: awake and alert and oriented  Post-procedure vital signs: reviewed and stable  Pain management: adequate  Airway patency: patent    PONV status at discharge: No PONV  Anesthetic complications: no      Cardiovascular status: hemodynamically stable  Respiratory status: unassisted  Hydration status: euvolemic  Follow-up not needed.              Vitals Value Taken Time   /73 04/18/24 1940   Temp 36.6 °C (97.8 °F) 04/18/24 1840   Pulse 79 04/18/24 1945   Resp 13 04/18/24 1945   SpO2 98 % 04/18/24 1945   Vitals shown include unfiled device data.      No case tracking events are documented in the log.      Pain/Ally Score: Pain Rating Prior to Med Admin: 6 (4/18/2024  7:10 PM)  Ally Score: 9 (4/18/2024  7:30 PM)

## 2024-04-19 NOTE — DISCHARGE SUMMARY
[facility] - 6th [facility] Medical Telemetry  Vascular Surgery  Discharge Summary      Patient Name: Peng Reed  MRN: [id number]  Admission Date: 4/18/2024  Hospital Length of Stay: 1 days  Discharge Date and Time:  04/19/2024 12:03 PM  Attending Physician: Joe Lynne MD   Discharging Provider: Joe Lynne MD  Primary Care Provider: No, Primary Doctor    HPI:   No notes on file    Procedure(s) (LRB):  ENDARTERECTOMY, FEMORAL (Left)  ARTERIOGRAM-LEG AND ULTRASOUND (Left)  PTA, ARTERY, FEMOROPOPLITEAL, WITH STENT INSERTION (Left)  EMBOLECTOMY (Right)     Hospital Course:  Patient was admitted from home and had the above procedure performed.  Please see the dictation of that procedure further information regarding it.  Postoperatively he was transferred to the floor in stable condition.  On postop day 1., his rest pain had improved.  He had a good Doppler signal in the foot.  His catheter was removed and he was voiding.  He was able to ambulate without significant difficulty.  He was deemed stable for discharge.    Goals of Care Treatment Preferences:         Consults:     Significant Diagnostic Studies: N/A    Pending Diagnostic Studies:       Procedure Component Value Units Date/Time    Specimen to Pathology [[id number]] Collected: 04/18/24 1532    Order Status: Sent Lab Status: In process Updated: 04/19/24 1035    Specimen: Tissue from Artery           Final Active Diagnoses:    Diagnosis Date Noted POA    PRINCIPAL PROBLEM:  Atherosclerosis of native arteries of extremities with rest pain, left leg [I70.222] 04/19/2024 Yes      Problems Resolved During this Admission:      Discharged Condition: good    Disposition: Home or Self Care    Follow Up:    Dr. Lynne 2 weeks    Patient Instructions:      No dressing needed     Notify your health care provider if you experience any of the following:  temperature >100.4     Notify your health care provider if you experience any of the  following:  severe uncontrolled pain     Notify your health care provider if you experience any of the following:  redness, tenderness, or signs of infection (pain, swelling, redness, odor or green/yellow discharge around incision site)     Activity as tolerated     Medications:  Reconciled Home Medications:      Medication List        START taking these medications      clopidogreL 75 mg tablet  Commonly known as: PLAVIX  Take 1 tablet (75 mg total) by mouth once daily.     HYDROcodone-acetaminophen 5-325 mg per tablet  Commonly known as: NORCO  Take 1 tablet by mouth every 4 (four) hours as needed (Moderate pain 2-5/10 pain scale).            CHANGE how you take these medications      * aspirin 81 MG EC tablet  Commonly known as: ECOTRIN  Take 81 mg by mouth once daily.  What changed: Another medication with the same name was added. Make sure you understand how and when to take each.     * aspirin 81 MG Chew  Take 1 tablet (81 mg total) by mouth once daily.  Start taking on: April 20, 2024  What changed: You were already taking a medication with the same name, and this prescription was added. Make sure you understand how and when to take each.           * This list has 2 medication(s) that are the same as other medications prescribed for you. Read the directions carefully, and ask your doctor or other care provider to review them with you.                CONTINUE taking these medications      atorvastatin 40 MG tablet  Commonly known as: LIPITOR  Take 1 tablet (40 mg total) by mouth once daily.     cilostazoL 50 MG Tab  Commonly known as: PLETAL  Take 1 tablet (50 mg total) by mouth 2 (two) times daily.            STOP taking these medications      traMADoL 50 mg tablet  Commonly known as: NAVID Lynne MD  Vascular Surgery  [facility] - 6th Floor Medical Telemetry

## 2024-04-19 NOTE — NURSING
Nurses Note -- 4 Eyes      4/18/2024   9:33 PM      Skin assessed during: Admit      [x] No Altered Skin Integrity Present    []Prevention Measures Documented      [] Yes- Altered Skin Integrity Present or Discovered   [] LDA Added if Not in Epic (Describe Wound)   [] New Altered Skin Integrity was Present on Admit and Documented in LDA   [] Wound Image Taken    Wound Care Consulted? No    Attending Nurse:  Judy Ennis RN/Staff Member:  JHON Blank

## 2024-04-19 NOTE — PROGRESS NOTES
Vascular Surgery Progress Note    Subjective:  No acute events  Tolerating diet  Ambulating without issue  Pain controlled  Voiding    Objective:  Vitals in normal limits  NAD on room air  Equal chest rise b/l  RRR  Abd nondistended  Doppler signal to L DP and PT, R DP  Groin incision with dressing intact, underlying skin is soft    Labs:  none    Imaging:  none      Assessment:  Pt is a 63yoM with PVD and multiple previous interventions, now POD1 s/p left femoral endarterectomy with stenting of left external iliac.    Plan:  - Doing well  - Continue prn pain meds  - continue asa, statin  - likely home today pending staff rounds    Nasreen Sotelo MD PGY4  [employer] General Surgery

## 2024-04-22 LAB — PSYCHE PATHOLOGY RESULT: NORMAL

## 2024-04-24 ENCOUNTER — PATIENT OUTREACH (OUTPATIENT)
Dept: ADMINISTRATIVE | Facility: CLINIC | Age: 64
End: 2024-04-24
Payer: MEDICARE

## 2024-04-24 NOTE — PROGRESS NOTES
C3 nurse attempted to contact Peng Reed  for a TCC post hospital discharge follow up call. No answer. Left voicemail with callback information. The patient has a scheduled HOS appointment with  Joe Lynne MD (Vascular Surgery) on Monday,  May 6, 2024 @ 8:20 AM.

## 2024-04-25 NOTE — PROGRESS NOTES
C3 nurse made third attempt to contact Peng Reed for a TCC post hospital discharge follow up call.

## 2024-04-25 NOTE — PROGRESS NOTES
C3 nurse made second attempt to contact Peng Reed for a TCC post hospital discharge follow up call.

## 2024-04-26 ENCOUNTER — TELEPHONE (OUTPATIENT)
Dept: VASCULAR SURGERY | Facility: CLINIC | Age: 64
End: 2024-04-26
Payer: MEDICARE

## 2024-04-26 NOTE — TELEPHONE ENCOUNTER
Pengjosephine Reed is s/p left common femoral endarterectomy with pta and left external iliac pta and stenting on 4/18/2024. Attempted to call patient to obtain post operative status, but was unsuccessful.   Voicemail unavailable.

## 2024-05-02 NOTE — PROGRESS NOTES
"    [facility] - Clinic Note  Joe Lynne MD      Patient Name: Peng Reed                   : 1960      MRN: [id number]   Visit Date: 2024       History Present Illness     Reason for Visit: Post-operative Follow up     Mr. Reed presents to the clinic for 3 week follow up s/p left common femoral endarterectomy with with left external iliac artery stenting and embolectomy of the right common iliac artery on 24. He denies fevers, drainage from his left groin incision, and worsening pain or swelling at the site.  He says he was walking around the house without any significant claudication symptoms at this point.  He does have some ankle pain he says that is started since he was walking more.  He also endorses some occasional swelling.  He was on aspirin, Plavix, and statin.  He was still smoking.  He says he initially quit after surgery for about 2 weeks, but has just picked upand is smoking about 2 cigarettes per day.         REVIEW OF SYSTEMS:    12 point review of systems conducted, negative except as stated in the history of present illness. See HPI for details.        Physical Exam      Vitals:    24 0804 24 0805   BP: 127/89 134/89   BP Location: Right arm Left arm   Pulse: 86 103   Weight: 69.9 kg (154 lb)    Height: 5' 9" (1.753 m)           General: well-nourished, no acute distress, and healthy appearing, alert, pleasant, conversant, and oriented  Respiratory: breathing easily, without respiratory distress, and normal breath sounds  Abdomen: normal and soft  Cardiology: regular rate and rhythm and no audible murmur      Lower Extremity Arterial Exam:  Left - posterior tibial is monophasic with doppler and dorsalis pedis is monophasic with doppler    Musculoskeletal:   Lower Extremity: left lower extremity has trace edema at ankle      Post Operative Incision:   Location: left groin  clean, dry, and healing appropriately                Assessment and Plan     Mr." Derek is a 63 y.o. this was left common femoral endarterectomy with external iliac angioplasty and stenting.  He was doing well overall.  His claudication symptoms have improved, I did discuss with him that he may still have claudication at longer distances as he has an occluded SFA with an isolated popliteal segment then reconstitutes his tibials.  Did discuss with him that his symptoms worsen some point down the road we may need to proceed with femoral to tibial bypass, however I would like to save that for should he develop tissue loss.  I do recommend he continue his dual antiplatelet therapies as well as his statin.  I again strongly encouraged him to quit smoking.  I would like him to return in about a month for a 6 week postop NIR.  We will then see him back in 6 months with an NIR and a left lower extremity duplex.      1. Atherosclerosis of native arteries of extremities with rest pain, left leg  - Ankle Brachial Indices (NIR); Future    2. S/P arterial stent    3. Smoker          Imaging Obtained/Reviewed   Study: none  Date:           Medical History     Past Medical History:   Diagnosis Date    Atherosclerosis of native arteries of extremities with intermittent claudication, unspecified extremity     Atherosclerosis of native arteries of extremities with rest pain, left leg     Colon cancer     Eczema     Gout, unspecified     Heart murmur     Hip pain, acute, left     Hyperlipidemia     PAD (peripheral artery disease)     Prostate cancer      Past Surgical History:   Procedure Laterality Date    ARTERIOGRAPHY  03/25/2024    Dr. Lynne    COLON SURGERY      EMBOLECTOMY Right 4/18/2024    Procedure: EMBOLECTOMY;  Surgeon: Joe Lynne MD;  Location: [facility];  Service: Peripheral Vascular;  Laterality: Right;  RIGHT EXTERNAL ILIAC EMBOLECTOMY    ENDARTERECTOMY OF FEMORAL ARTERY Left 4/18/2024    Procedure: ENDARTERECTOMY, FEMORAL;  Surgeon: Joe Lynne MD;  Location: [facility];  Service:  Peripheral Vascular;  Laterality: Left;  Left common femoral endarterectomy and angiogram (possible brachial approach) with left external iliac stenting and possible fem-fem bypass; OR 12 // XX    FEMORAL BYPASS Left 01/13/2021    Surgeon: Dr. Motley    FEMORAL BYPASS Right 03/04/2021    Surgeon: Dr. Motley    FLUOROSCOPIC ANGIOGRAPHY OF LOWER EXTREMITY WITH TOPICAL ULTRASOUND Bilateral 03/25/2024    Procedure: ARTERIOGRAM-LEG AND ULTRASOUND;  Surgeon: Joe Lynne MD;  Location: [facility];  Service: Peripheral Vascular;  Laterality: Bilateral;  bilateral lower extremity angiogram via left arm access    FLUOROSCOPIC ANGIOGRAPHY OF LOWER EXTREMITY WITH TOPICAL ULTRASOUND Left 4/18/2024    Procedure: ARTERIOGRAM-LEG AND ULTRASOUND;  Surgeon: Joe Lynne MD;  Location: [facility] OR;  Service: Peripheral Vascular;  Laterality: Left;    HIP ARTHROPLASTY Left     PROSTATE BIOPSY      PTA, ARTERY, FEMOROPOPLITEAL, WITH STENT INSERTION Left 4/18/2024    Procedure: PTA, ARTERY, FEMOROPOPLITEAL, WITH STENT INSERTION;  Surgeon: Joe Lynne MD;  Location: [facility] OR;  Service: Peripheral Vascular;  Laterality: Left;  LEFT EXTERNAL ILIAC ANGIOPLASTY WITH STENT     No family history on file.  Social History     Socioeconomic History    Marital status:    Tobacco Use    Smoking status: Every Day     Current packs/day: 0.50     Average packs/day: 0.5 packs/day for 44.3 years (22.2 ttl pk-yrs)     Types: Cigarettes     Start date: 1980    Smokeless tobacco: Never    Tobacco comments:     1 pack/week   Substance and Sexual Activity    Alcohol use: Not Currently     Comment: Occasionally    Drug use: Never     Current Outpatient Medications   Medication Instructions    aspirin (ECOTRIN) 81 mg, Oral, Daily    aspirin 81 mg, Oral, Daily    atorvastatin (LIPITOR) 40 mg, Oral, Daily    cilostazoL (PLETAL) 50 mg, Oral, 2 times daily    clopidogreL (PLAVIX) 75 mg, Oral, Daily    HYDROcodone-acetaminophen (NORCO)  5-325 mg per tablet 1 tablet, Oral, Every 4 hours PRN     Review of patient's allergies indicates:  No Known Allergies    Patient Care Team:  No, Primary Doctor as PCP - Anderson Cuello DPM as Consulting Physician (Podiatry)  Danette Motley MD as Consulting Physician (Cardiothoracic Surgery)        No follow-ups on file. In addition to their scheduled follow up, the patient has also been instructed to follow up on as needed basis.     Future Appointments   Date Time Provider Department Center   1/29/2025 11:00 AM Maria Guadalupe Chatman MD [place] [id number]

## 2024-05-06 ENCOUNTER — OFFICE VISIT (OUTPATIENT)
Dept: VASCULAR SURGERY | Facility: CLINIC | Age: 64
End: 2024-05-06
Payer: MEDICARE

## 2024-05-06 VITALS
WEIGHT: 154 LBS | SYSTOLIC BLOOD PRESSURE: 134 MMHG | BODY MASS INDEX: 22.81 KG/M2 | DIASTOLIC BLOOD PRESSURE: 89 MMHG | HEIGHT: 69 IN | HEART RATE: 103 BPM

## 2024-05-06 DIAGNOSIS — Z95.9 S/P ARTERIAL STENT: ICD-10-CM

## 2024-05-06 DIAGNOSIS — I70.222 ATHEROSCLEROSIS OF NATIVE ARTERIES OF EXTREMITIES WITH REST PAIN, LEFT LEG: Primary | ICD-10-CM

## 2024-05-06 DIAGNOSIS — F17.200 SMOKER: ICD-10-CM

## 2024-05-06 PROCEDURE — 99024 POSTOP FOLLOW-UP VISIT: CPT | Mod: ,,, | Performed by: SURGERY

## 2024-05-06 PROCEDURE — 3079F DIAST BP 80-89 MM HG: CPT | Mod: CPTII,,, | Performed by: SURGERY

## 2024-05-06 PROCEDURE — 3075F SYST BP GE 130 - 139MM HG: CPT | Mod: CPTII,,, | Performed by: SURGERY

## 2024-05-06 PROCEDURE — 1159F MED LIST DOCD IN RCRD: CPT | Mod: CPTII,,, | Performed by: SURGERY

## 2024-05-06 PROCEDURE — 1160F RVW MEDS BY RX/DR IN RCRD: CPT | Mod: CPTII,,, | Performed by: SURGERY

## 2024-06-21 DIAGNOSIS — I70.222 ATHEROSCLEROSIS OF NATIVE ARTERIES OF EXTREMITIES WITH REST PAIN, LEFT LEG: Primary | ICD-10-CM

## 2025-01-10 ENCOUNTER — TELEPHONE (OUTPATIENT)
Dept: VASCULAR SURGERY | Facility: CLINIC | Age: 65
End: 2025-01-10
Payer: MEDICAID

## (undated) DEVICE — KIT MANIFOLD LOW PRESS TUBING

## (undated) DEVICE — SHEATH AVANTI 6FR .014

## (undated) DEVICE — GLOVE PROTEXIS PI SYN SURG 7.5

## (undated) DEVICE — SUT 2-0 VICRYL / CT-1

## (undated) DEVICE — VISE RADIFOCUS MULTI TORQUE

## (undated) DEVICE — GUIDEWIRE STF .035X260CM ANG

## (undated) DEVICE — SUT PROLENE 5/0 RB-1 36 IN

## (undated) DEVICE — CLIP LIGATING MEDIUM

## (undated) DEVICE — SUT 2-0 12-18IN SILK

## (undated) DEVICE — TUBING CNTRST INJ ADPT 72IN

## (undated) DEVICE — SOL IRR NACL .9% 1000CC

## (undated) DEVICE — BOWL STERILE LARGE 32OZ

## (undated) DEVICE — INTRODUCER CATH 8F 11CM

## (undated) DEVICE — SUT MONOCRYL 4-0 PS-1 UND

## (undated) DEVICE — DRAPE BAG ISOLATION 20 X 20

## (undated) DEVICE — KIT GLIDESHEATH SLEND 6FR 10CM

## (undated) DEVICE — GUIDEWIRE TAPR STIFF ANG 260CM

## (undated) DEVICE — GUIDEWIRE INQWIRE SE 3MM JTIP

## (undated) DEVICE — DRAPE INCISE IOBAN 2 23X23IN

## (undated) DEVICE — CATH ANGIO SOFT VU 5FR 65CM

## (undated) DEVICE — ELECTRODE PATIENT RETURN DISP

## (undated) DEVICE — SET ANGIO ACIST CVI ANGIOTOUCH

## (undated) DEVICE — SYR B-D DISP CONTROL 10CC100/C

## (undated) DEVICE — Device

## (undated) DEVICE — LOOP STERION MAXI YEL 1X406MM

## (undated) DEVICE — KIT HAND CONTROL HIGH PRESSUR

## (undated) DEVICE — CATH GLIDECATH ANG 4FR 150CM

## (undated) DEVICE — CATH MARKER PIGTAIL 5FRX110CM

## (undated) DEVICE — CATH GLIDE 5FR 100CM 5/BOX

## (undated) DEVICE — KIT VASCULAR LAFAYETTE

## (undated) DEVICE — SPONGE LAP 18X18 PREWASHED

## (undated) DEVICE — DRESSING TRANS 4X4 TEGADERM

## (undated) DEVICE — WIPE MERCL POLYVIL ACETL 3X3IN

## (undated) DEVICE — COVER PROBE US 5.5X58L NON LTX

## (undated) DEVICE — KIT SURGICAL TURNOVER

## (undated) DEVICE — SUT 3-0 12-18IN SILK

## (undated) DEVICE — DEVICE BASIXCOMPAK INFL 20ML

## (undated) DEVICE — SYR 20ML BLUE LUER LOCK

## (undated) DEVICE — SPONGE GAUZE 16PLY 4X4

## (undated) DEVICE — SLEEVE ECLIPSE GOWN STRL 23IN

## (undated) DEVICE — CANNULA NASAL ADULT

## (undated) DEVICE — DRAPE C-ARM COVER EZ 36X28IN

## (undated) DEVICE — SUT PROLENE 6-0 BV-1 30IN

## (undated) DEVICE — CATH ULTRAVERSE 035 7X100X130

## (undated) DEVICE — ADHESIVE DERMABOND ADVANCED

## (undated) DEVICE — PROBE DOPPLER VTI DISP 8MHZ

## (undated) DEVICE — HEMOSTAT SURGICEL FIBRLR 2X4IN

## (undated) DEVICE — CATH EMB FOGARTY 5.5FRX40CM

## (undated) DEVICE — GUIDEWIRE ADVNTG 035X260CM ANG

## (undated) DEVICE — 5/0 PROLENE

## (undated) DEVICE — LOOP STERION MINI RED 8X406MM

## (undated) DEVICE — NDL PERCUTANEOUS ENTRYBSDN 18

## (undated) DEVICE — SUT 4-0 12-18IN SILK BLACK

## (undated) DEVICE — KIT ACCESS NDL ECHOGENIC 6FR

## (undated) DEVICE — KIT SYR REUSABLE

## (undated) DEVICE — SUT 3-0 VICRYL / SH (J416)

## (undated) DEVICE — SOL NORMAL USPCA 0.9%

## (undated) DEVICE — CANNULA INTRNL MAMRY ART 2MMTP

## (undated) DEVICE — TRAY CATH FOL SIL URIMTR 16FR

## (undated) DEVICE — DRESSING TELFA + BARR 4X6IN

## (undated) DEVICE — BAND TR COMP DEVICE REG 24CM